# Patient Record
Sex: MALE | Race: OTHER | Employment: OTHER | URBAN - METROPOLITAN AREA
[De-identification: names, ages, dates, MRNs, and addresses within clinical notes are randomized per-mention and may not be internally consistent; named-entity substitution may affect disease eponyms.]

---

## 2017-02-22 ENCOUNTER — HOSPITAL ENCOUNTER (OUTPATIENT)
Dept: LAB | Age: 67
Discharge: HOME | End: 2017-02-22

## 2017-02-22 LAB — SENTARA SPECIMEN COL,SENBCF: NORMAL

## 2017-02-22 PROCEDURE — 99001 SPECIMEN HANDLING PT-LAB: CPT | Performed by: INTERNAL MEDICINE

## 2017-06-12 ENCOUNTER — HOSPITAL ENCOUNTER (OUTPATIENT)
Dept: VASCULAR SURGERY | Age: 67
Discharge: HOME | End: 2017-06-12
Attending: INTERNAL MEDICINE
Payer: MEDICARE

## 2017-06-12 DIAGNOSIS — I70.213 ATHEROSCLEROSIS OF NATIVE ARTERY OF BOTH LOWER EXTREMITIES WITH INTERMITTENT CLAUDICATION (HCC): ICD-10-CM

## 2017-06-12 PROCEDURE — 93923 UPR/LXTR ART STDY 3+ LVLS: CPT

## 2017-06-12 NOTE — PROCEDURES
Leonor  *** FINAL REPORT ***    Name: Teresa Leija  MRN: [id number]    Outpatient  : 22 Mar 1950  HIS Order #: [id number]  [address] [facility] Visit #: [id number]  Date: 2017    TYPE OF TEST: Peripheral Arterial Testing    REASON FOR TEST  Claudication (left side), PVD unspecified, Limb pain    Right Leg  Segmentals: Abnormal                     mmHg  Brachial         107  High thigh  Low thigh         89  Calf              83  Posterior tibial  77  Dorsalis pedis    79  Peroneal  Metatarsal  Toe pressure      42  Doppler:    Abnormal  Ankle/Brachial: 0.74    Site of occlusive disease:-  femoral and tibioperoneal segments    Left Leg  Segmentals: Normal                     mmHg  Brachial         102  High thigh  Low thigh        127  Calf             112  Posterior tibial 101  Dorsalis pedis   108  Peroneal  Metatarsal  Toe pressure      46  Doppler:    Normal  Ankle/Brachial: 1.01    INTERPRETATION/FINDINGS  Physiologic testing was performed using continuous wave Doppler and  segmental pressures. 1. Moderate peripheral arterial disease indicated at rest in the right   leg. 2. Disease is located in the superficial femoral and tibioperoneal  segments on the right side. 3. No evidence of significant peripheral arterial disease at rest in  the left leg. 4. The right ankle/brachial index is 0.74 and the left ankle/brachial  index is 1.01.  5. The digital brachial index is abnormal measuring <.60 suggestive of   digital disease. 6. No significant changes when compared with the previous study done  on 16. ADDITIONAL COMMENTS    I have personally reviewed the data relevant to the interpretation of  this  study. TECHNOLOGIST: Joyce Londono. Amanda Pump  Signed: 2017 10:37 AM    PHYSICIAN: Lebron Garcias.  Miguel Humphrey MD  Signed: 2017 01:00 PM

## 2017-07-03 ENCOUNTER — HOSPITAL ENCOUNTER (OUTPATIENT)
Dept: GENERAL RADIOLOGY | Age: 67
Discharge: HOME | End: 2017-07-03
Attending: INTERNAL MEDICINE
Payer: MEDICARE

## 2017-07-03 ENCOUNTER — HOSPITAL ENCOUNTER (OUTPATIENT)
Dept: LAB | Age: 67
Discharge: HOME | End: 2017-07-03

## 2017-07-03 DIAGNOSIS — R07.81 RIB PAIN ON RIGHT SIDE: ICD-10-CM

## 2017-07-03 DIAGNOSIS — R07.9 RIGHT-SIDED CHEST PAIN: ICD-10-CM

## 2017-07-03 DIAGNOSIS — M86.9 OSTEOMYELITIS (HCC): ICD-10-CM

## 2017-07-03 LAB — SENTARA SPECIMEN COL,SENBCF: NORMAL

## 2017-07-03 PROCEDURE — 99001 SPECIMEN HANDLING PT-LAB: CPT | Performed by: INTERNAL MEDICINE

## 2017-07-03 PROCEDURE — 72072 X-RAY EXAM THORAC SPINE 3VWS: CPT

## 2017-07-03 PROCEDURE — 71020 XR CHEST PA LAT: CPT

## 2017-07-03 PROCEDURE — 71100 X-RAY EXAM RIBS UNI 2 VIEWS: CPT

## 2017-09-12 PROBLEM — I73.9 PAD (PERIPHERAL ARTERY DISEASE) (HCC): Status: ACTIVE | Noted: 2017-08-25

## 2018-01-22 ENCOUNTER — HOSPITAL ENCOUNTER (OUTPATIENT)
Dept: LAB | Age: 68
Discharge: HOME | End: 2018-01-22

## 2018-01-22 LAB — SENTARA SPECIMEN COL,SENBCF: NORMAL

## 2018-01-22 PROCEDURE — 99001 SPECIMEN HANDLING PT-LAB: CPT | Performed by: INTERNAL MEDICINE

## 2018-05-18 ENCOUNTER — HOSPITAL ENCOUNTER (OUTPATIENT)
Dept: LAB | Age: 68
Discharge: HOME | End: 2018-05-18
Payer: MEDICARE

## 2018-05-18 ENCOUNTER — HOSPITAL ENCOUNTER (OUTPATIENT)
Dept: PREADMISSION TESTING | Age: 68
Discharge: HOME | End: 2018-05-18
Payer: MEDICARE

## 2018-05-18 DIAGNOSIS — Z01.818 PRE-OP TESTING: ICD-10-CM

## 2018-05-18 DIAGNOSIS — N52.35 ERECTILE DYSFUNCTION AFTER PROSTATE BRACHYTHERAPY: ICD-10-CM

## 2018-05-18 LAB
ATRIAL RATE: 69 BPM
CALCULATED P AXIS, ECG09: 16 DEGREES
CALCULATED R AXIS, ECG10: -39 DEGREES
CALCULATED T AXIS, ECG11: 62 DEGREES
DIAGNOSIS, 93000: NORMAL
INR PPP: 1 (ref 0.8–1.2)
P-R INTERVAL, ECG05: 186 MS
PROTHROMBIN TIME: 12.6 SEC (ref 11.5–15.2)
Q-T INTERVAL, ECG07: 408 MS
QRS DURATION, ECG06: 144 MS
QTC CALCULATION (BEZET), ECG08: 437 MS
VENTRICULAR RATE, ECG03: 69 BPM

## 2018-05-18 PROCEDURE — 93005 ELECTROCARDIOGRAM TRACING: CPT

## 2018-05-18 PROCEDURE — 36415 COLL VENOUS BLD VENIPUNCTURE: CPT | Performed by: UROLOGY

## 2018-05-18 PROCEDURE — 85610 PROTHROMBIN TIME: CPT | Performed by: UROLOGY

## 2018-06-04 ENCOUNTER — HOSPITAL ENCOUNTER (OUTPATIENT)
Dept: LAB | Age: 68
Discharge: HOME | End: 2018-06-04

## 2018-06-04 ENCOUNTER — HOSPITAL ENCOUNTER (OUTPATIENT)
Dept: PREADMISSION TESTING | Age: 68
Discharge: HOME | End: 2018-06-04

## 2018-06-04 LAB — SENTARA SPECIMEN COL,SENBCF: NORMAL

## 2018-06-04 PROCEDURE — 99001 SPECIMEN HANDLING PT-LAB: CPT | Performed by: INTERNAL MEDICINE

## 2018-06-13 ENCOUNTER — ANESTHESIA EVENT (OUTPATIENT)
Dept: SURGERY | Age: 68
DRG: 709 | End: 2018-06-13
Payer: MEDICARE

## 2018-06-14 ENCOUNTER — ANESTHESIA (OUTPATIENT)
Dept: SURGERY | Age: 68
DRG: 709 | End: 2018-06-14
Payer: MEDICARE

## 2018-06-14 ENCOUNTER — HOSPITAL ENCOUNTER (INPATIENT)
Age: 68
LOS: 1 days | Discharge: HOME | DRG: 709 | End: 2018-06-15
Attending: UROLOGY | Admitting: UROLOGY
Payer: MEDICARE

## 2018-06-14 LAB
CREAT SERPL-MCNC: 1.38 MG/DL (ref 0.6–1.3)
PLATELET # BLD AUTO: 166 K/UL (ref 135–420)

## 2018-06-14 PROCEDURE — C1813 PROSTHESIS, PENILE, INFLATAB: HCPCS | Performed by: UROLOGY

## 2018-06-14 PROCEDURE — 77030029684 HC NEB SM VOL KT MONA -A

## 2018-06-14 PROCEDURE — 74011250637 HC RX REV CODE- 250/637: Performed by: UROLOGY

## 2018-06-14 PROCEDURE — 77030020229 HC RETRCTR SCROT SYS BSC -D: Performed by: UROLOGY

## 2018-06-14 PROCEDURE — 76010000132 HC OR TIME 2.5 TO 3 HR: Performed by: UROLOGY

## 2018-06-14 PROCEDURE — 36415 COLL VENOUS BLD VENIPUNCTURE: CPT | Performed by: UROLOGY

## 2018-06-14 PROCEDURE — 0VUS0JZ SUPPLEMENT PENIS WITH SYNTHETIC SUBSTITUTE, OPEN APPROACH: ICD-10-PCS | Performed by: UROLOGY

## 2018-06-14 PROCEDURE — 74011250636 HC RX REV CODE- 250/636

## 2018-06-14 PROCEDURE — 74011250637 HC RX REV CODE- 250/637: Performed by: NURSE ANESTHETIST, CERTIFIED REGISTERED

## 2018-06-14 PROCEDURE — 94640 AIRWAY INHALATION TREATMENT: CPT

## 2018-06-14 PROCEDURE — 77030034696 HC CATH URETH FOL 2W BARD -A: Performed by: UROLOGY

## 2018-06-14 PROCEDURE — 77030012510 HC MSK AIRWY LMA TELE -B: Performed by: NURSE ANESTHETIST, CERTIFIED REGISTERED

## 2018-06-14 PROCEDURE — 77030002933 HC SUT MCRYL J&J -A: Performed by: UROLOGY

## 2018-06-14 PROCEDURE — 76210000000 HC OR PH I REC 2 TO 2.5 HR: Performed by: UROLOGY

## 2018-06-14 PROCEDURE — 74011000250 HC RX REV CODE- 250

## 2018-06-14 PROCEDURE — 74011250636 HC RX REV CODE- 250/636: Performed by: UROLOGY

## 2018-06-14 PROCEDURE — 65270000029 HC RM PRIVATE

## 2018-06-14 PROCEDURE — 77030031139 HC SUT VCRL2 J&J -A: Performed by: UROLOGY

## 2018-06-14 PROCEDURE — 77030005537 HC CATH URETH BARD -A: Performed by: UROLOGY

## 2018-06-14 PROCEDURE — 77030002966 HC SUT PDS J&J -A: Performed by: UROLOGY

## 2018-06-14 PROCEDURE — 82565 ASSAY OF CREATININE: CPT | Performed by: UROLOGY

## 2018-06-14 PROCEDURE — 77030013079 HC BLNKT BAIR HGGR 3M -A: Performed by: NURSE ANESTHETIST, CERTIFIED REGISTERED

## 2018-06-14 PROCEDURE — 77030011640 HC PAD GRND REM COVD -A: Performed by: UROLOGY

## 2018-06-14 PROCEDURE — 77030013782 HC KT PENIL INFL AMS -C: Performed by: UROLOGY

## 2018-06-14 PROCEDURE — 74011000250 HC RX REV CODE- 250: Performed by: UROLOGY

## 2018-06-14 PROCEDURE — 85049 AUTOMATED PLATELET COUNT: CPT | Performed by: UROLOGY

## 2018-06-14 PROCEDURE — 74011250636 HC RX REV CODE- 250/636: Performed by: NURSE ANESTHETIST, CERTIFIED REGISTERED

## 2018-06-14 PROCEDURE — 77030032490 HC SLV COMPR SCD KNE COVD -B: Performed by: UROLOGY

## 2018-06-14 PROCEDURE — 74011000258 HC RX REV CODE- 258: Performed by: UROLOGY

## 2018-06-14 PROCEDURE — 77030020263 HC SOL INJ SOD CL0.9% LFCR 1000ML: Performed by: UROLOGY

## 2018-06-14 PROCEDURE — 77030010507 HC ADH SKN DERMBND J&J -B: Performed by: UROLOGY

## 2018-06-14 PROCEDURE — 76060000036 HC ANESTHESIA 2.5 TO 3 HR: Performed by: UROLOGY

## 2018-06-14 DEVICE — INFLATABLE PENILE PROSTHESIS, INHIBIZONE/PRECONNECTED - PENOSCROTAL 1 PUMP 2 CYLINDERS
Type: IMPLANTABLE DEVICE | Site: PENIS | Status: FUNCTIONAL
Brand: AMS 700 CX MS PUMP

## 2018-06-14 DEVICE — AMS 700 INFLATABLE PENILE PROSTHESIS, 1 SPHERICAL RESERVOIR, INHIBIZONE TREATED
Type: IMPLANTABLE DEVICE | Site: PENIS | Status: FUNCTIONAL
Brand: AMS 700 SPHERICAL RESERVOIR

## 2018-06-14 DEVICE — NON-STACKABLE REAR TIP EXTENDERS FOR AMS 700 CX AND LGX CYLINDERS
Type: IMPLANTABLE DEVICE | Site: PENIS | Status: FUNCTIONAL
Brand: REAR TIP EXTENDER

## 2018-06-14 RX ORDER — MAGNESIUM SULFATE 100 %
4 CRYSTALS MISCELLANEOUS AS NEEDED
Status: DISCONTINUED | OUTPATIENT
Start: 2018-06-14 | End: 2018-06-14

## 2018-06-14 RX ORDER — INSULIN LISPRO 100 [IU]/ML
INJECTION, SOLUTION INTRAVENOUS; SUBCUTANEOUS ONCE
Status: DISCONTINUED | OUTPATIENT
Start: 2018-06-14 | End: 2018-06-14

## 2018-06-14 RX ORDER — HYDROMORPHONE HYDROCHLORIDE 2 MG/ML
INJECTION, SOLUTION INTRAMUSCULAR; INTRAVENOUS; SUBCUTANEOUS AS NEEDED
Status: DISCONTINUED | OUTPATIENT
Start: 2018-06-14 | End: 2018-06-14 | Stop reason: HOSPADM

## 2018-06-14 RX ORDER — SODIUM CHLORIDE, SODIUM LACTATE, POTASSIUM CHLORIDE, CALCIUM CHLORIDE 600; 310; 30; 20 MG/100ML; MG/100ML; MG/100ML; MG/100ML
25 INJECTION, SOLUTION INTRAVENOUS CONTINUOUS
Status: DISCONTINUED | OUTPATIENT
Start: 2018-06-14 | End: 2018-06-14 | Stop reason: HOSPADM

## 2018-06-14 RX ORDER — LIDOCAINE HYDROCHLORIDE 10 MG/ML
0.1 INJECTION INFILTRATION; PERINEURAL AS NEEDED
Status: DISCONTINUED | OUTPATIENT
Start: 2018-06-14 | End: 2018-06-14 | Stop reason: HOSPADM

## 2018-06-14 RX ORDER — HYDROMORPHONE HYDROCHLORIDE 1 MG/ML
.5-1 INJECTION, SOLUTION INTRAMUSCULAR; INTRAVENOUS; SUBCUTANEOUS
Status: DISCONTINUED | OUTPATIENT
Start: 2018-06-14 | End: 2018-06-15 | Stop reason: HOSPADM

## 2018-06-14 RX ORDER — HYDROMORPHONE HYDROCHLORIDE 2 MG/ML
0.2 INJECTION, SOLUTION INTRAMUSCULAR; INTRAVENOUS; SUBCUTANEOUS AS NEEDED
Status: DISCONTINUED | OUTPATIENT
Start: 2018-06-14 | End: 2018-06-14

## 2018-06-14 RX ORDER — HYDROMORPHONE HYDROCHLORIDE 1 MG/ML
INJECTION, SOLUTION INTRAMUSCULAR; INTRAVENOUS; SUBCUTANEOUS
Status: COMPLETED
Start: 2018-06-14 | End: 2018-06-14

## 2018-06-14 RX ORDER — BUDESONIDE 0.5 MG/2ML
500 INHALANT ORAL
Status: DISCONTINUED | OUTPATIENT
Start: 2018-06-14 | End: 2018-06-15 | Stop reason: HOSPADM

## 2018-06-14 RX ORDER — OXYCODONE AND ACETAMINOPHEN 5; 325 MG/1; MG/1
1 TABLET ORAL AS NEEDED
Status: DISCONTINUED | OUTPATIENT
Start: 2018-06-14 | End: 2018-06-14

## 2018-06-14 RX ORDER — OXYCODONE AND ACETAMINOPHEN 5; 325 MG/1; MG/1
1-2 TABLET ORAL
Status: DISCONTINUED | OUTPATIENT
Start: 2018-06-14 | End: 2018-06-15 | Stop reason: HOSPADM

## 2018-06-14 RX ORDER — HYDROMORPHONE HYDROCHLORIDE 2 MG/ML
0.5 INJECTION, SOLUTION INTRAMUSCULAR; INTRAVENOUS; SUBCUTANEOUS
Status: DISCONTINUED | OUTPATIENT
Start: 2018-06-14 | End: 2018-06-14

## 2018-06-14 RX ORDER — ALBUTEROL SULFATE 0.83 MG/ML
2.5 SOLUTION RESPIRATORY (INHALATION)
Status: DISCONTINUED | OUTPATIENT
Start: 2018-06-14 | End: 2018-06-15 | Stop reason: HOSPADM

## 2018-06-14 RX ORDER — IPRATROPIUM BROMIDE AND ALBUTEROL SULFATE 2.5; .5 MG/3ML; MG/3ML
SOLUTION RESPIRATORY (INHALATION)
Status: COMPLETED
Start: 2018-06-14 | End: 2018-06-14

## 2018-06-14 RX ORDER — MIDAZOLAM HYDROCHLORIDE 1 MG/ML
INJECTION, SOLUTION INTRAMUSCULAR; INTRAVENOUS AS NEEDED
Status: DISCONTINUED | OUTPATIENT
Start: 2018-06-14 | End: 2018-06-14 | Stop reason: HOSPADM

## 2018-06-14 RX ORDER — TAMSULOSIN HYDROCHLORIDE 0.4 MG/1
0.4 CAPSULE ORAL 2 TIMES DAILY
Status: DISCONTINUED | OUTPATIENT
Start: 2018-06-14 | End: 2018-06-15 | Stop reason: HOSPADM

## 2018-06-14 RX ORDER — VANCOMYCIN/0.9 % SOD CHLORIDE 1.5G/250ML
1500 PLASTIC BAG, INJECTION (ML) INTRAVENOUS
Status: COMPLETED | OUTPATIENT
Start: 2018-06-14 | End: 2018-06-14

## 2018-06-14 RX ORDER — FAMOTIDINE 20 MG/1
20 TABLET, FILM COATED ORAL ONCE
Status: COMPLETED | OUTPATIENT
Start: 2018-06-14 | End: 2018-06-14

## 2018-06-14 RX ORDER — SODIUM CHLORIDE, SODIUM LACTATE, POTASSIUM CHLORIDE, CALCIUM CHLORIDE 600; 310; 30; 20 MG/100ML; MG/100ML; MG/100ML; MG/100ML
100 INJECTION, SOLUTION INTRAVENOUS CONTINUOUS
Status: DISCONTINUED | OUTPATIENT
Start: 2018-06-14 | End: 2018-06-14

## 2018-06-14 RX ORDER — HYDROMORPHONE HYDROCHLORIDE 1 MG/ML
INJECTION, SOLUTION INTRAMUSCULAR; INTRAVENOUS; SUBCUTANEOUS
Status: DISPENSED
Start: 2018-06-14 | End: 2018-06-15

## 2018-06-14 RX ORDER — DIPHENHYDRAMINE HYDROCHLORIDE 50 MG/ML
12.5 INJECTION, SOLUTION INTRAMUSCULAR; INTRAVENOUS
Status: DISCONTINUED | OUTPATIENT
Start: 2018-06-14 | End: 2018-06-14

## 2018-06-14 RX ORDER — SODIUM CHLORIDE 0.9 % (FLUSH) 0.9 %
5-10 SYRINGE (ML) INJECTION AS NEEDED
Status: DISCONTINUED | OUTPATIENT
Start: 2018-06-14 | End: 2018-06-15 | Stop reason: HOSPADM

## 2018-06-14 RX ORDER — SODIUM CHLORIDE 0.9 % (FLUSH) 0.9 %
5-10 SYRINGE (ML) INJECTION EVERY 8 HOURS
Status: DISCONTINUED | OUTPATIENT
Start: 2018-06-14 | End: 2018-06-15 | Stop reason: HOSPADM

## 2018-06-14 RX ORDER — ALBUTEROL SULFATE 90 UG/1
2 AEROSOL, METERED RESPIRATORY (INHALATION)
Status: DISCONTINUED | OUTPATIENT
Start: 2018-06-14 | End: 2018-06-14 | Stop reason: CLARIF

## 2018-06-14 RX ORDER — PROPOFOL 10 MG/ML
INJECTION, EMULSION INTRAVENOUS AS NEEDED
Status: DISCONTINUED | OUTPATIENT
Start: 2018-06-14 | End: 2018-06-14 | Stop reason: HOSPADM

## 2018-06-14 RX ORDER — DIPHENHYDRAMINE HCL 25 MG
25 CAPSULE ORAL
Status: DISCONTINUED | OUTPATIENT
Start: 2018-06-14 | End: 2018-06-15 | Stop reason: HOSPADM

## 2018-06-14 RX ORDER — LIDOCAINE HYDROCHLORIDE 20 MG/ML
INJECTION, SOLUTION EPIDURAL; INFILTRATION; INTRACAUDAL; PERINEURAL AS NEEDED
Status: DISCONTINUED | OUTPATIENT
Start: 2018-06-14 | End: 2018-06-14 | Stop reason: HOSPADM

## 2018-06-14 RX ORDER — LISINOPRIL 5 MG/1
2.5 TABLET ORAL DAILY
Status: DISCONTINUED | OUTPATIENT
Start: 2018-06-15 | End: 2018-06-15 | Stop reason: HOSPADM

## 2018-06-14 RX ORDER — NALOXONE HYDROCHLORIDE 0.4 MG/ML
0.4 INJECTION, SOLUTION INTRAMUSCULAR; INTRAVENOUS; SUBCUTANEOUS AS NEEDED
Status: DISCONTINUED | OUTPATIENT
Start: 2018-06-14 | End: 2018-06-15 | Stop reason: HOSPADM

## 2018-06-14 RX ORDER — CIPROFLOXACIN 500 MG/1
500 TABLET ORAL 2 TIMES DAILY
Qty: 28 TAB | Refills: 0 | Status: SHIPPED | OUTPATIENT
Start: 2018-06-14 | End: 2018-06-28

## 2018-06-14 RX ORDER — ONDANSETRON 2 MG/ML
INJECTION INTRAMUSCULAR; INTRAVENOUS AS NEEDED
Status: DISCONTINUED | OUTPATIENT
Start: 2018-06-14 | End: 2018-06-14 | Stop reason: HOSPADM

## 2018-06-14 RX ORDER — ONDANSETRON 2 MG/ML
4 INJECTION INTRAMUSCULAR; INTRAVENOUS
Status: DISCONTINUED | OUTPATIENT
Start: 2018-06-14 | End: 2018-06-15 | Stop reason: HOSPADM

## 2018-06-14 RX ORDER — FENTANYL CITRATE 50 UG/ML
INJECTION, SOLUTION INTRAMUSCULAR; INTRAVENOUS AS NEEDED
Status: DISCONTINUED | OUTPATIENT
Start: 2018-06-14 | End: 2018-06-14 | Stop reason: HOSPADM

## 2018-06-14 RX ORDER — EPHEDRINE SULFATE 50 MG/ML
INJECTION, SOLUTION INTRAVENOUS AS NEEDED
Status: DISCONTINUED | OUTPATIENT
Start: 2018-06-14 | End: 2018-06-14 | Stop reason: HOSPADM

## 2018-06-14 RX ORDER — BUDESONIDE AND FORMOTEROL FUMARATE DIHYDRATE 160; 4.5 UG/1; UG/1
1 AEROSOL RESPIRATORY (INHALATION) 2 TIMES DAILY
Status: DISCONTINUED | OUTPATIENT
Start: 2018-06-14 | End: 2018-06-14 | Stop reason: CLARIF

## 2018-06-14 RX ORDER — MONTELUKAST SODIUM 10 MG/1
10 TABLET ORAL
Status: DISCONTINUED | OUTPATIENT
Start: 2018-06-14 | End: 2018-06-15 | Stop reason: HOSPADM

## 2018-06-14 RX ORDER — HEPARIN SODIUM 5000 [USP'U]/ML
5000 INJECTION, SOLUTION INTRAVENOUS; SUBCUTANEOUS EVERY 8 HOURS
Status: DISCONTINUED | OUTPATIENT
Start: 2018-06-14 | End: 2018-06-15 | Stop reason: HOSPADM

## 2018-06-14 RX ORDER — FAMOTIDINE 20 MG/1
TABLET, FILM COATED ORAL
Status: DISPENSED
Start: 2018-06-14 | End: 2018-06-14

## 2018-06-14 RX ORDER — CARVEDILOL 6.25 MG/1
6.25 TABLET ORAL DAILY
Status: DISCONTINUED | OUTPATIENT
Start: 2018-06-15 | End: 2018-06-15 | Stop reason: HOSPADM

## 2018-06-14 RX ORDER — IPRATROPIUM BROMIDE AND ALBUTEROL SULFATE 2.5; .5 MG/3ML; MG/3ML
3 SOLUTION RESPIRATORY (INHALATION)
Status: COMPLETED | OUTPATIENT
Start: 2018-06-14 | End: 2018-06-14

## 2018-06-14 RX ORDER — DOCUSATE SODIUM 100 MG/1
100 CAPSULE, LIQUID FILLED ORAL DAILY
Qty: 30 CAP | Refills: 0 | Status: SHIPPED | OUTPATIENT
Start: 2018-06-14 | End: 2018-06-25

## 2018-06-14 RX ORDER — BUDESONIDE 0.5 MG/2ML
500 INHALANT ORAL
Status: DISCONTINUED | OUTPATIENT
Start: 2018-06-14 | End: 2018-06-14

## 2018-06-14 RX ORDER — ACETAMINOPHEN 325 MG/1
650 TABLET ORAL
Status: DISCONTINUED | OUTPATIENT
Start: 2018-06-14 | End: 2018-06-15 | Stop reason: HOSPADM

## 2018-06-14 RX ORDER — OXYCODONE AND ACETAMINOPHEN 5; 325 MG/1; MG/1
1 TABLET ORAL
Qty: 10 TAB | Refills: 0 | Status: SHIPPED | OUTPATIENT
Start: 2018-06-14 | End: 2018-06-25

## 2018-06-14 RX ORDER — DEXTROSE MONOHYDRATE 25 G/50ML
25-50 INJECTION, SOLUTION INTRAVENOUS AS NEEDED
Status: DISCONTINUED | OUTPATIENT
Start: 2018-06-14 | End: 2018-06-14

## 2018-06-14 RX ORDER — SODIUM CHLORIDE 0.9 % (FLUSH) 0.9 %
5-10 SYRINGE (ML) INJECTION AS NEEDED
Status: DISCONTINUED | OUTPATIENT
Start: 2018-06-14 | End: 2018-06-14

## 2018-06-14 RX ORDER — ARFORMOTEROL TARTRATE 15 UG/2ML
15 SOLUTION RESPIRATORY (INHALATION)
Status: DISCONTINUED | OUTPATIENT
Start: 2018-06-14 | End: 2018-06-15 | Stop reason: HOSPADM

## 2018-06-14 RX ADMIN — BUDESONIDE 500 MCG: 0.5 INHALANT RESPIRATORY (INHALATION) at 20:26

## 2018-06-14 RX ADMIN — FENTANYL CITRATE 50 MCG: 50 INJECTION, SOLUTION INTRAMUSCULAR; INTRAVENOUS at 09:27

## 2018-06-14 RX ADMIN — MONTELUKAST SODIUM 10 MG: 10 TABLET, FILM COATED ORAL at 21:47

## 2018-06-14 RX ADMIN — FENTANYL CITRATE 50 MCG: 50 INJECTION, SOLUTION INTRAMUSCULAR; INTRAVENOUS at 09:18

## 2018-06-14 RX ADMIN — TOBRAMYCIN SULFATE 400 MG: 40 INJECTION, SOLUTION INTRAMUSCULAR; INTRAVENOUS at 08:32

## 2018-06-14 RX ADMIN — LIDOCAINE HYDROCHLORIDE 80 MG: 20 INJECTION, SOLUTION EPIDURAL; INFILTRATION; INTRACAUDAL; PERINEURAL at 09:14

## 2018-06-14 RX ADMIN — HYDROMORPHONE HYDROCHLORIDE 1 MG: 2 INJECTION, SOLUTION INTRAMUSCULAR; INTRAVENOUS; SUBCUTANEOUS at 10:25

## 2018-06-14 RX ADMIN — PROPOFOL 30 MG: 10 INJECTION, EMULSION INTRAVENOUS at 09:15

## 2018-06-14 RX ADMIN — TAMSULOSIN HYDROCHLORIDE 0.4 MG: 0.4 CAPSULE ORAL at 21:47

## 2018-06-14 RX ADMIN — HYDROMORPHONE HYDROCHLORIDE 0.5 MG: 2 INJECTION, SOLUTION INTRAMUSCULAR; INTRAVENOUS; SUBCUTANEOUS at 13:23

## 2018-06-14 RX ADMIN — FENTANYL CITRATE 50 MCG: 50 INJECTION, SOLUTION INTRAMUSCULAR; INTRAVENOUS at 09:48

## 2018-06-14 RX ADMIN — EPHEDRINE SULFATE 10 MG: 50 INJECTION, SOLUTION INTRAVENOUS at 09:31

## 2018-06-14 RX ADMIN — IPRATROPIUM BROMIDE AND ALBUTEROL SULFATE 3 ML: .5; 3 SOLUTION RESPIRATORY (INHALATION) at 12:20

## 2018-06-14 RX ADMIN — FENTANYL CITRATE 50 MCG: 50 INJECTION, SOLUTION INTRAMUSCULAR; INTRAVENOUS at 10:00

## 2018-06-14 RX ADMIN — Medication 10 ML: at 18:04

## 2018-06-14 RX ADMIN — VANCOMYCIN HYDROCHLORIDE 1500 MG: 10 INJECTION, POWDER, LYOPHILIZED, FOR SOLUTION INTRAVENOUS at 09:09

## 2018-06-14 RX ADMIN — ONDANSETRON 4 MG: 2 INJECTION INTRAMUSCULAR; INTRAVENOUS at 11:47

## 2018-06-14 RX ADMIN — SODIUM CHLORIDE, SODIUM LACTATE, POTASSIUM CHLORIDE, AND CALCIUM CHLORIDE 25 ML/HR: 600; 310; 30; 20 INJECTION, SOLUTION INTRAVENOUS at 08:14

## 2018-06-14 RX ADMIN — OXYCODONE HYDROCHLORIDE AND ACETAMINOPHEN 2 TABLET: 5; 325 TABLET ORAL at 18:09

## 2018-06-14 RX ADMIN — ARFORMOTEROL TARTRATE 15 MCG: 15 SOLUTION RESPIRATORY (INHALATION) at 20:26

## 2018-06-14 RX ADMIN — ACETAMINOPHEN 650 MG: 325 TABLET ORAL at 21:47

## 2018-06-14 RX ADMIN — HYDROMORPHONE HYDROCHLORIDE 0.5 MG: 1 INJECTION, SOLUTION INTRAMUSCULAR; INTRAVENOUS; SUBCUTANEOUS at 12:17

## 2018-06-14 RX ADMIN — PROPOFOL 50 MG: 10 INJECTION, EMULSION INTRAVENOUS at 09:46

## 2018-06-14 RX ADMIN — Medication 10 ML: at 22:25

## 2018-06-14 RX ADMIN — IPRATROPIUM BROMIDE AND ALBUTEROL SULFATE 3 ML: 2.5; .5 SOLUTION RESPIRATORY (INHALATION) at 12:20

## 2018-06-14 RX ADMIN — FAMOTIDINE 20 MG: 20 TABLET ORAL at 08:00

## 2018-06-14 RX ADMIN — HEPARIN SODIUM 5000 UNITS: 5000 INJECTION, SOLUTION INTRAVENOUS; SUBCUTANEOUS at 22:24

## 2018-06-14 RX ADMIN — MIDAZOLAM HYDROCHLORIDE 2 MG: 1 INJECTION, SOLUTION INTRAMUSCULAR; INTRAVENOUS at 09:09

## 2018-06-14 RX ADMIN — PROPOFOL 170 MG: 10 INJECTION, EMULSION INTRAVENOUS at 09:14

## 2018-06-14 RX ADMIN — HEPARIN SODIUM 5000 UNITS: 5000 INJECTION, SOLUTION INTRAVENOUS; SUBCUTANEOUS at 18:03

## 2018-06-14 RX ADMIN — HYDROMORPHONE HYDROCHLORIDE 0.5 MG: 2 INJECTION, SOLUTION INTRAMUSCULAR; INTRAVENOUS; SUBCUTANEOUS at 11:02

## 2018-06-14 NOTE — OP NOTES
[address] REPORT    Ralph Nunez  MR#: [id number]  : 1950  ACCOUNT #: [de-identified]   DATE OF SERVICE: 2018    PREOPERATIVE DIAGNOSIS:  Erectile dysfunction. POSTOPERATIVE DIAGNOSIS:  Erectile dysfunction. PROCEDURE PERFORMED:  Implant of AMS  inflatable penile prosthesis. IMPLANT:  AMS  inflatable penile prosthesis. PRIMARY SURGEON:  Hiram Quiroz MD     ASSISTANTS:  Nayely Garcia MD, fellow, and Bradley Nava, resident. ANESTHESIA:  General.    ESTIMATED BLOOD LOSS:  50 mL. IMPLANT PLACED. 18 cm cylinders with a 3 cm rear tip extenders, 65 mL reservoir in the right space of Retzius and a scrotal pump. DRAINS PLACED:  A 14 Brazilian urethral Houston. COMPLICATIONS:  None. CONDITION:  Stable. FINDINGS:  An 11 cm corpora measured to 13 cm proximally and 8 cm distally on both sides. INDICATIONS FOR PROCEDURE:  The patient is a 56-year-old gentleman who suffers from erectile dysfunction following radiation therapy for prostate cancer a few years ago. He has tried and failed oral medications as well as intracavernosal injections. He is currently using the ROLF to maintain length, but is not satisfied with this and would prefer to proceed with inflatable penile prosthesis. The risks and benefits have all been explained and the patient signed the preop consent. TECHNIQUE:  The patient was brought to the operating room. Preoperative prophylactic antibiotics had been given and SCD stockings were installed and activated for induction of general anesthesia. He was kept in the supine position. The genital area was completely shaved, prepped with a 10 minute to alcohol base skin prep and then he was draped in a sterile fashion and a timeout according to protocol was performed. We performed a transverse scrotal incision and dissected down to the base of the corpora. The Ron retractor was used for visualization.   We dissected the corpora bilaterally as well as the corpus spongiosum as proximally as possible. A 14-Citizen of [place] had been inserted at the beginning of the case. We then marked and performed our corporotomies as proximally as possible bilaterally. We dilated both sides from size 9 to a size 12 [place] dilators and then performed urethral leak test on both sides which was negative. We measured on both sides at 13 cm proximally and 8 cm distally, we opted for an 18 cm cylinders with 3 cm rear tip extenders which were prepared on the side table in a sterile fashion by the scrub nurse as well as the 65 mL reservoir. While this was being prepared, we dissected through the right inguinal canal and pierced the transversalis fashion to the space of Retzius to create the pocket for the reservoir. We did this after the bladder was emptied. We then placed the reservoir here and filled it with 65 mL of injectable saline. We then placed the cylinders on both sides into place using the Marie needles and the [facility] introducer. Once both cylinders were in, we did a trial pump and we were satisfied with the placement of our cylinders. The cylinders were deflated and the corporotomies were closed in a watertight fashion. We then dissected the scrotal pouch for the scrotal pump. The tubing was buried and then we performed the connectors with the cord connected to the tip that is provided by the company. We then closed the incision in 2 layers being sure to adequately buried the tubing. The skin was closed with a running 4-0 Monocryl stitch. A bunion wrap, dry dressing was placed. The patient was awoken from general anesthesia and brought to PACU in stable condition. He will be admitted overnight for postoperative care. MD Norma Jeffers / BONI  D: 06/14/2018 12:02     T: 06/14/2018 13:34  JOB #: [id number]      I was present and have independently reviewed the diagnosis and discussed the plan with Elin Elias MD , and I agree. Vasquez Sylvester MD, MD  Urology of [facility]  [address] [place] [address].  Denise Cifuentes

## 2018-06-14 NOTE — H&P
PREOPERATIVE HISTORY AND PHYSICAL EXAMINATION     Subjective:      I am seeing Mr. Kourtney Alex, a 79 y.o. male, for preop history and physical exam.     Planned surgery: LGX vs CX IPP implantation. Location: [facility]   Admission date: 6/14/2018  Surgery date: 6/14/2018  Admitting physician: Dr. Mally Diego  Admitting diagnosis: ED. Allergies: No Known Allergies     Past medical history:        Past Medical History:   Diagnosis Date    Asthma      Benign hypertensive heart disease with congestive heart failure and with combined systolic and diastolic dysfunction (HCC)      Bronchial asthma      CAD (coronary artery disease)      Cardiomyopathy (HCC)       LVEF 35-40% (01/16)    CHF (congestive heart failure) ([facility])      Chronic combined systolic and diastolic heart failure ([facility] 04/21/2014    Coronary artery disease involving native coronary artery of native heart without angina pectoris 1/16/2016     CABG X 2 ( LIMA to LAD , SVG to RCA)    Decreased calculated glomerular filtration rate (GFR) 5/6/2016     Calculated GFR equivalent to that of CKD stage 2 = 60-89 ml/min     Dyslipidemia      Elevated PSA      Erectile dysfunction      Esophageal reflux      H/O echocardiogram      Heart disease      History of external beam radiation therapy       8/2014    History of kidney stones      History of vitamin D deficiency 9/23/2014    HTN (hypertension)      Hypercholesterolemia      Hypogonadism in male 3/26/2015    Kidney stone      Malignant neoplasm of prostate [facility])       XRBT 2014    Malignant neoplasm without specification of site [facility])      Methicillin resistant Staphylococcus aureus infection 4/29/2016    Osteomyelitis of vertebra of thoracic region ([facility] 4/29/2016    Paraspinal mass      Personal history of prostate cancer        [id number] [place] Grade 3+4. Presenting PSA of 13.5. Brachytherapy 2014.     Renal impairment      SOB (shortness of breath)      UTI (urinary tract infection)        Past surgical history:         Past Surgical History:   Procedure Laterality Date    HX COLONOSCOPY        HX OTHER SURGICAL   09/18/2017     femoral angiogram    HX UROLOGICAL   8/10/15, 10/21/15     Ureter. Diag. ,       Social history:    Social History                Social History    Marital status: LEGALLY        Spouse name: N/A    Number of children: N/A    Years of education: N/A            Social History Main Topics    Smoking status: Former Smoker       Packs/day: 3.00       Years: 40.00       Types: Cigarettes       Quit date: 2003    Smokeless tobacco: Never Used    Alcohol use No    Drug use: No    Sexual activity: Yes       Partners: Female           Other Topics Concern    None      Social History Narrative         Family history:   Family History   Problem Relation Age of Onset    Adopted: Yes    Family history unknown: Yes         Current medications:          Current Outpatient Prescriptions   Medication Sig Dispense Refill    umeclidinium (INCRUSE ELLIPTA) 62.5 mcg/actuation inhaler Take  inhaled by mouth.        oxyCODONE-acetaminophen (PERCOCET) 5-325 mg per tablet Take 1 Tab by mouth daily. Max Daily Amount: 1 Tab. 12 Tab 0    sertraline (ZOLOFT) 50 mg tablet Take  by mouth daily.        SYMBICORT 160-4.5 mcg/actuation HFAA     5    clopidogrel (PLAVIX) 75 mg tab     2    atorvastatin (LIPITOR) 80 mg tablet     5    carvedilol (COREG) 6.25 mg tablet     3    FLOMAX 0.4 mg capsule Take 1 Cap by mouth two (2) times a day. 60 Cap 11    lisinopril (PRINIVIL, ZESTRIL) 2.5 mg tablet Take 1 Tab by mouth daily.  [12:00 PM]  Indications: CHRONIC HEART FAILURE 15 Tab 0    albuterol (VENTOLIN HFA) 90 mcg/actuation inhaler Take 2 Puffs by inhalation every six (6) hours as needed for Wheezing.        montelukast (SINGULAIR) 10 mg tablet Take 10 mg by mouth nightly.             Chief complaint:        Chief Complaint   Patient presents with    Erectile Dysfunction       Patient presents today for a hospital workup for IPP placement s/p on 6/14/18.         History of present illness: Luther Hull is a 79 y.o. male with a history of ED who presents today      Continue using Trimix ICI for ED, but this has become less effective and expensive. Patient is interested in prosthesis placement and has been scheduled for 6/14/2018 at [facility]. Has been using ROLF to prepare tissues. Patient denies any significant changes in medical history since last seen. No voiding complaints. No dysuria or gross hematuria. No n/v/f/c.      Urologic history   He is s/p XRBT 8/2014. Last PSA was 0.07 on 2/21/2018 - followed by Dr. Genaro Almanzar. H/o kidney stones s/p URS w/ LL 10/2015 w/ Dr. Noemy Ozuna      Review of systems:   Constitutional: Fever: No  Skin: Rash: No  HEENT: Hearing difficulty: No  Eyes: Blurred vision: No  Cardiovascular: Chest pain: No  Respiratory: Shortness of breath: No  Gastrointestinal: Nausea/vomiting: No  Musculoskeletal: Back pain: No  Neurological: Weakness: No  Psychological: Memory loss: No  Comments/additional findings:        Objective:   Physical examination:       Visit Vitals    /70    Ht 5' 10.5\" (1.791 m)    Wt 220 lb (99.8 kg)    BMI 31.12 kg/m2      Constitutional: WDWN, Pleasant and appropriate affect, No acute distress. CV:  No peripheral swelling noted. RRR. No murmurs, rubs, or gallops. Respiratory: No respiratory distress or difficulties. Breath sounds clear and equal bilaterally. Abdomen:  No abdominal masses or tenderness. No CVA tenderness. No hernias noted.  Male:    SCROTUM:  No scrotal rash or lesions noticed. Normal bilateral testes and epididymis. PENIS: Urethral meatus normal in location and size. No urethral discharge. Penis measured to 16cm stretched. Skin: No jaundice. Neuro/Psych:  Alert and oriented x 3. Affect appropriate. Lymphatic:   No enlarged inguinal lymph nodes.    Assessment/Plan:   Justo Griffin will be brought to the operating room. We will proceed with planned surgery. All questions were answered. The indications, options, risks and benefits of the procedure have been explained in great detail. The complications discussed are in no way limited to the content of this note. The patient understands, and requests to proceed with surgery. Consent has been obtained.     CONSENT FOR PLACEMENT OF INFLATABLE PENILE PROSTHESIS, CYSTOSCOPY     The risks, benefits and alternatives to surgical intervention were discussed at length with the patient. Alternatives include no intervention, use of oral medications such as sildenafil (Viagra), injection of medications into the penis to produce an erection, use of the vacuum erection device or implantation of a penile prosthesis. The patient is most interested in a prosthesis so this was reviewed in the greatest detail. The types of prosthesis including malleable rods, 2 piece and 3 piece prostheses were reviewed previously with the patient. The intended benefit of penile prosthesis placement is to allow the patient to use the device to create an artificial erection and resume relatively normal sexual activity. Risks of the procedure include, but are not limited to, bleeding, wound infection, and injury to surrounding structures during the procedure. If the urethra is injured during implantation of the device, the procedure may have to be aborted before completing device placement to allow the injury to heal.  Wound infection is one of the most concerning complications as an infection around the prosthesis would likely require removal of the prosthesis to allow the infection to completely clear up, resulting in ongoing inability to have erections. Some, but not all, of the available prosthetic devices have an antibiotic coating which can reduce the risk of infection around the device.   We discussed that the prosthesis is a mechanical device and will eventually wear out. If this occurs and the patient desires to continue with sexual activity, the device can be replaced but this will require a repeat trip to the operating room. The patient expresses an understanding of these risks, benefits and alternatives, had all questions answered and requests that we proceed as planned with placement of an inflatable penile prosthesis in the MOR.     Patient requests post op meds so that he has time to fill. Rx for Percocet 5-325mg QHS provided. No additional pain meds will be provided.      Lana Brandt MD  [facility]  A Division of Urology of [address] [place] [address] seen in pre-op on am of surgery w Dr Diogo Winter well, no changes since last seen    Pre-op UCx -ve  Sigmund Gene OCTOR    Consent signed for IPP  All Qs answered    James Mora MD - [address] fellow    I was present and have independently reviewed the diagnosis and discussed the plan with Pearl Martinez MD , and I agree. Lana Brandt MD, MD  Urology of [facility]  [address] [facility].  Denise Cifuentes

## 2018-06-14 NOTE — BRIEF OP NOTE
BRIEF OPERATIVE NOTE    Date of Procedure: 6/14/2018   Preoperative Diagnosis: n52.35 erecticle dysfunction following radiation theraphy  Postoperative Diagnosis: n52.35 erecticle dysfunction following radiation theraphy    Dictation #: [id number]  Procedure(s):  inflatable PENILE PROSTHESIS implantation   Surgeon(s) and Role:     * Marcy May MD - Primary         Surgical Assistant: Larisa Tejeda fellow and Radha Lee resident    Surgical Staff:  Circ-1: Batsheva Hudson RN  Circ-Relief: Vivek Govea RN  Scrub Tech-1: Maribel Daigle  Scrub Tech-Relief: Amaya Velez  Event Time In   Incision Start  9:43 AM   Incision Close 11:43 AM     Anesthesia: General   Estimated Blood Loss: 50cc  Specimens: * No specimens in log *   Findings: 21cm corpora x 2 (13 prox and 8 distal), 18cm+3 CX cylinders used, 65cc reservoir in right space of Retzius   Complications: none  Implants:   Implant Name Type Inv. Item Serial No.  Lot No. LRB No. Used Action   ams 700 accessory kit    [employer] [id number] N/A 1 Implanted   EXTENDER REAR TIP 12-14 --  - [id number]  EXTENDER REAR TIP 12-14 --   [facility] [id number] N/A 1 Implanted   CYL PUMP PENILE CX MS PS --  - [id number]  CYL PUMP PENILE CX MS PS --   [employer] [facility] [id number] N/A 1 Implanted   RESERVIOR PENILE PROS IZ 65 --  - [id number]   RESERVIOR PENILE PROS IZ 65 --    [employer] [facility] [id number] N/A 1 Implanted         Jelani Feliciano MD - [address] [place] [address] fellow      I was present and have independently reviewed the diagnosis and discussed the plan with Veronica Goldstein MD , and I agree. Marcy May MD, MD  Urology of [facility]  [address] [facility].  [facility]

## 2018-06-14 NOTE — PERIOP NOTES
Rec'd care of pt from OR via stretcher. Resp even and unlabored. Attached to monitor. VSS. OR, MAR and anesthesia report acknowledged. [place] patent. Will cont to monitor. 1210  Pt with expiratory wheezes noted. Also stated he feels a \"little tight\". Anesthesia notified. Will cont to monitor. 443 [facility] tx given for above. Tolerating well. Will cont to monitor. Tamela Carrasco 1778, CRNA notified regarding blood pressure measurement. Stated okay to discharge pt to assigned room.

## 2018-06-14 NOTE — PROGRESS NOTES
TRANSFER - IN REPORT:    Verbal report received from Lisa Dance on Budrashmi Sandifer  being received from PACU for routine post - op      Report consisted of patients Situation, Background, Assessment and   Recommendations(SBAR). Information from the following report(s) SBAR and Procedure Summary was reviewed with the receiving nurse. Opportunity for questions and clarification was provided. 1415 Received pt via stretcher awake and alert in NAD. Dressing to penis intact with scant dried blood at tip. Houston draining clear yellow urine. Oriented to call bell, phone and IS with pt giving return demonstration.

## 2018-06-14 NOTE — ANESTHESIA PREPROCEDURE EVALUATION
Anesthetic History   No history of anesthetic complications            Review of Systems / Medical History  Patient summary reviewed and pertinent labs reviewed    Pulmonary  Within defined limits          Asthma        Neuro/Psych   Within defined limits           Cardiovascular  Within defined limits  Hypertension      CHF    CAD and CABG    Exercise tolerance: <4 METS     GI/Hepatic/Renal  Within defined limits       Renal disease: CRI       Endo/Other  Within defined limits      Arthritis and cancer     Other Findings   Comments: Documentation of current medication  Current medications obtained, documented and obtained? YES      Risk Factors for Postoperative nausea/vomiting:       History of postoperative nausea/vomiting? NO       Female? NO       Motion sickness? NO       Intended opioid administration for postoperative analgesia? YES      Smoking Abstinence:  Current Smoker? NO  Elective Surgery? YES  Seen preoperatively by anesthesiologist or proxy prior to day of surgery? YES  Pt abstained from smoking 24 hours prior to anesthesia?  YES    Preventive care/screening for High Blood Pressure:  Aged 18 years and older: YES  Screened for high blood pressure: YES  Patients with high blood pressure referred to primary care provider   for BP management: YES                   Physical Exam    Airway  Mallampati: II  TM Distance: 4 - 6 cm  Neck ROM: normal range of motion   Mouth opening: Normal     Cardiovascular    Rhythm: regular  Rate: normal         Dental    Dentition: Poor dentition  Comments: Missing several teeth including upper front   Pulmonary  Breath sounds clear to auscultation               Abdominal  GI exam deferred       Other Findings            Anesthetic Plan    ASA: 3  Anesthesia type: general          Induction: Intravenous  Anesthetic plan and risks discussed with: Patient

## 2018-06-14 NOTE — ANESTHESIA POSTPROCEDURE EVALUATION
Post-Anesthesia Evaluation and Assessment    Patient: Shoshana Duane MRN: [id number]  SSN: [id number]    YOB: 1950  Age: 79 y.o. Sex: male       Cardiovascular Function/Vital Signs  Visit Vitals    BP (!) 164/109    Pulse 86    Temp 36.9 °C (98.4 °F)    Resp 11    Ht 5' 10.5\" (1.791 m)    Wt 97.3 kg (214 lb 8 oz)    SpO2 98%    BMI 30.34 kg/m2       Patient is status post general anesthesia for Procedure(s):  inflatable PENILE PROSTHESIS implantation . Nausea/Vomiting: None    Postoperative hydration reviewed and adequate. Pain:  Pain Scale 1: Numeric (0 - 10) (06/14/18 0755)  Pain Intensity 1: 0 (06/14/18 0755)   Managed    Neurological Status:   Neuro (WDL): Within Defined Limits (06/14/18 0756)   At baseline    Mental Status and Level of Consciousness: Arousable    Pulmonary Status:   O2 Device: Oxygen mask (06/14/18 1156)   Adequate oxygenation and airway patent    Complications related to anesthesia: None    Post-anesthesia assessment completed.  No concerns    Signed By: Delfina Pike MD     June 14, 2018

## 2018-06-14 NOTE — IP AVS SNAPSHOT
[facility] 
 
 
 [address] Patient: Pamela Adams MRN: [id number] WDB:[id number] About your hospitalization You were admitted on:  June 14, 2018 You last received care in the:  [address] [place] [address],[address] You were discharged on:  More 15, 2018 Why you were hospitalized Your primary diagnosis was:  Not on File Your diagnoses also included:  Erectile Dysfunction Follow-up Information Follow up With Details Comments Contact Info Justyna Boss MD Call  [address] [place] [address] 
[phone] MD [facility] [id number] [phone] Your Scheduled Appointments Monday June 25, 2018  8:30 AM EDT  
POST OP with Justyna Boss MD  
[facility]. [address] ([address] [place] [address]) [address] [address]  
[phone] Monday July 09, 2018 11:15 AM EDT  
POST OP with Justyna Boss MD  
[facility]. [address] ([address] [place] [address]) [address] [address]  
[phone] Monday July 23, 2018 11:15 AM EDT  
POST OP with Justyna Boss MD  
[facility]. [address] ([address] [place] [address]) [address] [address]  
[phone] Discharge Orders None A check selam indicates which time of day the medication should be taken. My Medications START taking these medications Instructions Each Dose to Equal  
 Morning Noon Evening Bedtime  
 ciprofloxacin HCl 500 mg tablet Commonly known as:  CIPRO Your last dose was: Your next dose is: Take 1 Tab by mouth two (2) times a day for 14 days. 500 mg  
    
   
   
   
  
 docusate sodium 100 mg capsule Commonly known as:  Celina Sauce Your last dose was: Your next dose is: Take 1 Cap by mouth daily for 30 days. Continue while on narcotic pain medication to help prevent constipation 100 mg CHANGE how you take these medications Instructions Each Dose to Equal  
 Morning Noon Evening Bedtime  
 oxyCODONE-acetaminophen 5-325 mg per tablet Commonly known as:  PERCOCET What changed:   
- when to take this 
- reasons to take this 
- additional instructions Your last dose was: Your next dose is: Take 1 Tab by mouth every four (4) hours as needed for Pain. Max Daily Amount: 6 Tabs. May take 2 tabs for severe pain. Avoid driving while on this medication. Do not exceed 4000mg of acetaminophen per day 1 Tab CONTINUE taking these medications Instructions Each Dose to Equal  
 Morning Noon Evening Bedtime  
 atorvastatin 80 mg tablet Commonly known as:  LIPITOR Your last dose was: Your next dose is:    
   
   
      
   
   
   
  
 carvedilol 6.25 mg tablet Commonly known as:  Yimi Gomez Your last dose was: Your next dose is:    
   
   
      
   
   
   
  
 clopidogrel 75 mg Tab Commonly known as:  PLAVIX Your last dose was: Your next dose is: FLOMAX 0.4 mg capsule Generic drug:  tamsulosin Your last dose was: Your next dose is: Take 1 Cap by mouth two (2) times a day. 0.4 mg  
    
   
   
   
  
 lisinopril 2.5 mg tablet Commonly known as:  Kacey Flight Your last dose was: Your next dose is: Take 1 Tab by mouth daily. [12:00 PM]  Indications: CHRONIC HEART FAILURE  
 2.5 mg  
    
   
   
   
  
 montelukast 10 mg tablet Commonly known as:  SINGULAIR Your last dose was: Your next dose is: Take 10 mg by mouth nightly.   
 10 mg  
    
   
   
   
  
 SYMBICORT 160-4.5 mcg/actuation Hfaa  
 Generic drug:  budesonide-formoterol Your last dose was: Your next dose is:    
   
   
      
   
   
   
  
 umeclidinium 62.5 mcg/actuation inhaler Commonly known as:  INCRUSE ELLIPTA Your last dose was: Your next dose is: Take  inhaled by mouth. VENTOLIN HFA 90 mcg/actuation inhaler Generic drug:  albuterol Your last dose was: Your next dose is: Take 2 Puffs by inhalation every six (6) hours as needed for Wheezing. 2 Puff Where to Get Your Medications Information on where to get these meds will be given to you by the nurse or doctor. ! Ask your nurse or doctor about these medications  
  ciprofloxacin HCl 500 mg tablet  
 docusate sodium 100 mg capsule  
 oxyCODONE-acetaminophen 5-325 mg per tablet Opioid Education Prescription Opioids: What You Need to Know: 
 
 
 
F-face looks uneven A-arms unable to move or move unevenly S-speech slurred or non-existent T-time-call 911 as soon as signs and symptoms begin-DO NOT go Back to bed or wait to see if you get better-TIME IS BRAIN. Warning Signs of HEART ATTACK Call 911 if you have these symptoms: 
? Chest discomfort. Most heart attacks involve discomfort in the center of the chest that lasts more than a few minutes, or that goes away and comes back. It can feel like uncomfortable pressure, squeezing, fullness, or pain. ? Discomfort in other areas of the upper body. Symptoms can include pain or discomfort in one or both arms, the back, neck, jaw, or stomach. ? Shortness of breath with or without chest discomfort. ? Other signs may include breaking out in a cold sweat, nausea, or lightheadedness. Don't wait more than five minutes to call [address]! Fast action can save your life. Calling 911 is almost always the fastest way to get lifesaving treatment. Emergency Medical Services staff can begin treatment when they arrive  up to an hour sooner than if someone gets to the hospital by car. The discharge information has been reviewed with the patient. The patient verbalized understanding. Discharge medications reviewed with the patient and appropriate educational materials and side effects teaching were provided. Patient armband removed and shredded. ___________________________________________________________________________________________________________________________________ Alternate pain medication with ibuprofen. You may restart Plavix in 48 hours. Starrucca Ring Keep wounds clean and dry. Avoid bathing/swimming until otherwise cleared by Dr. Neena Moran. You may shower tomorrow. [employer] Announcement We are excited to announce that we are making your provider's discharge notes available to you in [employer]. You will see these notes when they are completed and signed by the physician that discharged you from your recent hospital stay. If you have any questions or concerns about any information you see in InNetworkt, please call the Health Information Department where you were seen or reach out to your Primary Care Provider for more information about your plan of care. Introducing [facility]! Estimado Dennis Brian Villanueva por solicitar jose cuenta de MyChart [place]orence. Nuestros registros indican que usted ya tiene jose cuenta MyChart [place].  Usted puede acceder a callejas cuenta en cualquier momento en [email or web address] Sabía usted que usted puede acceder a callejas hospital y las instrucciones de reece ER en cualquier momento en MyChart ? También puede revisar [employer] de las pruebas de callejas hospitalización o visita a urgencias . Información Adicional 
 
Si tiene alguna pregunta , por favor visite la sección de preguntas frecuentes del sitio web MyChart en [email or web address] . Recuerde, MyChart NO es que se utilizará para las necesidades urgentes. Para emergencias médicas , llame al 911 . Ahora disponible en callejas iPhone y Android ! Introducing Param Mahmood As a [facility] patient, I wanted to make you aware of our electronic visit tool called Param Mahmood. [employer] allows you to connect within minutes with a medical provider 24 hours a day, seven days a week via a mobile device or tablet or logging into a secure website from your computer. You can access Param Mahmood from anywhere in the [place]. A virtual visit might be right for you when you have a simple condition and feel like you just dont want to get out of bed, or cant get away from work for an appointment, when your regular [facility] provider is not available (evenings, weekends or holidays), or when youre out of town and need minor care. Electronic visits cost only $49 and if the [employer] provider determines a prescription is needed to treat your condition, one can be electronically transmitted to a nearby pharmacy*. Please take a moment to enroll today if you have not already done so. The enrollment process is free and takes just a few minutes. To enroll, please download the hiyalife melissa to your tablet or phone, or visit [email or web address] to enroll on your computer.    
And, as an [address] [place] [address] patient with a [employer] account, the results of your visits will be scanned into your electronic medical record and your primary care provider will be able to view the scanned results. We urge you to continue to see your regular [facility] provider for your ongoing medical care. And while your primary care provider may not be the one available when you seek a AGNIT[employer]O virtual visit, the peace of mind you get from getting a real diagnosis real time can be priceless. For more information on [employer], view our Frequently Asked Questions (FAQs) at [email or web address]. Sincerely, 
 
Aimee Perkins MD 
Chief Medical Officer [facility] Jenna Santamaria *:  certain medications cannot be prescribed via AGNITiO Unresulted tests-please follow up with your PCP on these results Procedure/Test Authorizing Provider Osman Rock MD  
 PLATELET COUNT Vasquez Sylvester MD  
  
Providers Seen During Your Hospitalization Provider Specialty Primary office phone Vasquez Sylvester MD Urology [phone] Your Primary Care Physician (PCP) Primary Care Physician Office Phone Office Fax Odessa Wild [phone] You are allergic to the following No active allergies Recent Documentation Height Weight BMI Smoking Status 1.791 m 97.3 kg 30.34 kg/m2 Former Smoker Emergency Contacts Name Discharge Info Relation Home Work Mobile JoseAmira DISCHARGE CAREGIVER [3] Spouse [3] [phone] Patient Belongings The following personal items are in your possession at time of discharge: 
  Dental Appliances: None  Visual Aid: None      Home Medications: None   Jewelry: None  Clothing: Pants, Shirt, Footwear, Undergarments    Other Valuables: None Discharge Instructions Attachments/References WOUND CARE (Colombian) LAXATIVE, STIMULANT COMBINATION (ORAL) (Colombian) CIPROFLOXACIN (ORAL) (Colombian) Patient Handouts Wound Care: After Your Visit Your Care Instructions Taking good care of your wound at home will help it heal quickly and reduce your chance of infection. The doctor has checked you carefully, but problems can develop later. If you notice any problems or new symptoms, get medical treatment right away. Follow-up care is a key part of your treatment and safety. Be sure to make and go to all appointments, and call your doctor if you are having problems. It's also a good idea to know your test results and keep a list of the medicines you take. How can you care for yourself at home? · Clean the area with soap and water 2 times a day unless your doctor gives you different instructions. Don't use hydrogen peroxide or alcohol, which can slow healing. ¨ You may cover the wound with a thin layer of antibiotic ointment, such as bacitracin, and a nonstick bandage. ¨ Apply more ointment and replace the bandage as needed. · Take pain medicines exactly as directed. Some pain is normal with a wound, but do not ignore pain that is getting worse instead of better. You could have an infection. ¨ If the doctor gave you a prescription medicine for pain, take it as prescribed. ¨ If you are not taking a prescription pain medicine, ask your doctor if you can take an over-the-counter medicine. · Your doctor may have closed your wound with stitches (sutures), staples, or skin glue. ¨ If you have stitches, your doctor may remove them after several days to 2 weeks. Or you may have stitches that dissolve on their own. ¨ If you have staples, your doctor may remove them after 7 to 10 days. ¨ If your wound was closed with skin glue, the glue will wear off in a few days to 2 weeks. When should you call for help? Call your doctor now or seek immediate medical care if: 
· You have signs of infection, such as: 
¨ Increased pain, swelling, warmth, or redness near the wound. ¨ Red streaks leading from the wound. ¨ Pus draining from the wound. ¨ A fever. · You bleed so much from your incision that you soak one or more bandages over 2 to 4 hours. Watch closely for changes in your health, and be sure to contact your doctor if: · The wound is not getting better each day. Where can you learn more? Go to GeneCapture.be Enter M289 in the search box to learn more about \"Wound Care: After Your Visit. \"  
© 9099-3073 Healthwise, Incorporated. Care instructions adapted under license by [employer] (which disclaims liability or warranty for this information). This care instruction is for use with your licensed healthcare professional. If you have questions about a medical condition or this instruction, always ask your healthcare professional. [place] [address] any warranty or liability for your use of this information. Content Version: 91.0.555962; Last Revised: April 23, 2012 Laxative, Stimulant Combination (By mouth) Treats constipation by helping you have a bowel movement. Brand Name(s): Colace, Doc-Q-Lax, Dok Plus, [facility] Stool Softener & Laxative, Good Sense Stimulant Laxative Plus, Laxacin, Medi-Laxx, Elisabeth-Colace, [facility] [employer] Laxative and Stool Softener, [facility] P Col-Rite, Senexon-S, Senna-S, Senna-Time S, SennaLax-S, SennaPrompt There may be other brand names for this medicine. When This Medicine Should Not Be Used: You should not use this medicine if you have had an allergic reaction to senna, sennosides, docusate, casanthranol, or psyllium. Some brand names for these medicines are Correctol® stool softener, Ex-Lax®, Colace®, or Metamucil®. Make sure your doctor knows if you are allergic to any other laxative medicines. How to Use This Medicine:  
Tablet, Liquid Filled Capsule, Liquid, Granule, Capsule, Powder for Suspension · Your doctor will tell you how much medicine to use. Do not use more than directed. · If you have had a sudden change in your bowel movements in the past two weeks, ask your doctor before using this medicine. · Follow the instructions on the medicine label if you are using this medicine without a prescription. · Drink a full glass of water when you take this medicine. One full glass of water is about 8 ounces or 1 cup. Drinking 6 to 8 full glasses of water every day will help keep your bowel movements soft. · You might need to mix the granules or powder with water before you take each dose. Drink this mixture right away. Do not swallow the granules or powder dry unless the directions say you can. · Measure the oral liquid medicine with a marked measuring spoon, oral syringe, or medicine cup. · Use this medicine at bedtime, unless your doctor tells you otherwise. If a dose is missed: · Take a dose as soon as you remember. If it is almost time for your next dose, wait until then and take a regular dose. Do not take extra medicine to make up for a missed dose. How to Store and Dispose of This Medicine: · Store the medicine in a closed container at room temperature, away from heat, moisture, and direct light. · Ask your pharmacist, doctor, or health caregiver about the best way to dispose of any outdated medicine or medicine no longer needed. · Keep all medicine out of the reach of children. Never share your medicine with anyone. Drugs and Foods to Avoid: Ask your doctor or pharmacist before using any other medicine, including over-the-counter medicines, vitamins, and herbal products. · Make sure your doctor knows if you are also using mineral oil. · Some laxatives need to be taken 2 hours before or 2 hours after other medicines. If you need to take any other medicine, ask your health caregiver if you need to follow a special schedule. Warnings While Using This Medicine: · Make sure your doctor knows if you are pregnant or breast feeding. · Do not use this medicine if you have stomach pain, nausea, or vomiting, unless your health caregiver tells you to use it. · If you do not have a bowel movement after using this medicine, talk to your doctor. Most people will have a bowel movement within 6 to 12 hours after using this laxative. · You should not use this laxative for more than 1 week unless your doctor says it is okay. Laxatives may be habit-forming and can harm your bowels if you use them too long. Possible Side Effects While Using This Medicine:  
Call your doctor right away if you notice any of these side effects: · Bleeding from your rectum. · Skin rash. · Urine turns a different color. If you notice these less serious side effects, talk with your doctor: · Diarrhea, cramps, nausea, burping. If you notice other side effects that you think are caused by this medicine, tell your doctor. Call your doctor for medical advice about side effects. You may report side effects to FDA at 3-216-FDA-6519 © 2017 [address]  Information is for End User's use only and may not be sold, redistributed or otherwise used for commercial purposes. The above information is an  only. It is not intended as medical advice for individual conditions or treatments. Talk to your doctor, nurse or pharmacist before following any medical regimen to see if it is safe and effective for you. Ciprofloxacin (By mouth) Ciprofloxacin (tdb-[id number]-OTBU-a-sin) Treats infections and plague. This medicine is a quinolone antibiotic. Brand Name(s): Cipro There may be other brand names for this medicine. When This Medicine Should Not Be Used: This medicine is not right for everyone. Do not use it if you had an allergic reaction to ciprofloxacin or to similar medicines. How to Use This Medicine:  
Liquid, Tablet, Long Acting Tablet · Your doctor will tell you how much medicine to use.  Do not use more than directed. Take this medicine at the same time each day. · You may take this medicine with or without food. Do not take this medicine with only a source of calcium, including milk, yogurt, or juice that contains added calcium. You may have foods or drinks that contain calcium as part of a larger meal. 
· Swallow the extended-release tablet whole. Do not crush, break, or chew it. · Oral liquid: Shake for at least 15 seconds just before each use. The liquid has small beads floating in it. Do not chew the beads when you drink the liquid. Measure the oral liquid medicine with a marked measuring spoon, oral syringe, or medicine cup. · Tablet: Swallow whole. Do not break, crush, or chew it. · Drink extra fluids so you will urinate more often and help prevent kidney problems. · Take all of the medicine in your prescription to clear up your infection, even if you feel better after the first few doses. · This medicine should come with a Medication Guide. Ask your pharmacist for a copy if you do not have one. · Missed dose: Take a dose as soon as you remember. If it is almost time for your next dose, wait until then and take a regular dose. Do not take extra medicine to make up for a missed dose. · Store the medicine in a closed container at room temperature, away from heat, moisture, and direct light. Throw away any leftover liquid medicine after 14 days. Drugs and Foods to Avoid: Ask your doctor or pharmacist before using any other medicine, including over-the-counter medicines, vitamins, and herbal products. · Do not use this medicine together with tizanidine. · Some foods and medicines can affect how ciprofloxacin works. Tell your doctor if you are using any of the following: ¨ Clozapine, cyclosporine, duloxetine, lidocaine, methotrexate, olanzapine, pentoxifylline, phenytoin, probenecid, ropinirole, sildenafil, theophylline, zolpidem ¨ Antibiotic (including azithromycin, clarithromycin, erythromycin) ¨ Blood thinner (including warfarin) ¨ Diabetes medicine (including glimepiride, glyburide) ¨ Medicine for depression or mental illness ¨ Medicine for heart rhythm problems (including amiodarone, procainamide, quinidine, sotalol) ¨ NSAID pain medicine (including aspirin, celecoxib, diclofenac, ibuprofen, naproxen) ¨ Steroid medicine (including hydrocortisone, methylprednisolone, prednisone) · Take ciprofloxacin at least 2 hours before or 6 hours after you take didanosine buffered tablets for oral suspension or the pediatric powder for oral suspension, sucralfate, or antacids, multivitamins, or other products containing aluminum, magnesium, lanthanum, sevelamer, iron, or zinc. 
· This medicine slows the digestion of caffeine, so it might affect you for longer than normal. 
Warnings While Using This Medicine: · Tell your doctor if you are pregnant or breastfeeding, or if you have kidney disease, liver disease, diabetes, heart disease, myasthenia gravis, or a history of heart rhythm problems (including prolonged QT interval), nerve problems, or seizures. Tell your doctor if you have ever had tendon or joint problems, including rheumatoid arthritis, or if you have received a transplant. · This medicine may cause the following problems: 
¨ Tendinitis and tendon rupture (which may happen after treatment ends) ¨ Liver damage ¨ Nerve damage in the arms or legs ¨ Heart rhythm changes ¨ Changes in blood sugar levels · This medicine may make you dizzy, drowsy, or lightheaded. Do not drive or do anything else that could be dangerous until you know how this medicine affects you. · This medicine can cause diarrhea. Call your doctor if the diarrhea becomes severe, does not stop, or is bloody. Do not take any medicine to stop diarrhea until you have talked to your doctor. Diarrhea can occur 2 months or more after you stop taking this medicine. · This medicine may make your skin more sensitive to sunlight.  Wear sunscreen. Do not use sunlamps or tanning beds. · Call your doctor if your symptoms do not improve or if they get worse. · Keep all medicine out of the reach of children. Never share your medicine with anyone. Possible Side Effects While Using This Medicine:  
Call your doctor right away if you notice any of these side effects: · Allergic reaction: Itching or hives, swelling in your face or hands, swelling or tingling in your mouth or throat, chest tightness, trouble breathing · Blistering, peeling, red skin rash · Dark urine, pale stools, nausea, vomiting, loss of appetite, stomach pain, yellow skin or eyes · Diarrhea which may contain blood · Fainting, dizziness, or lightheadedness · Fast, slow, or uneven heartbeat · Numbness, tingling, weakness, or burning pain in your hands, arms, legs, or feet · Pain, stiffness, swelling, or bruises around your ankle, leg, shoulder, or other joints · Seizures, severe headache, unusual thoughts or behaviors, trouble sleeping, feeling anxious, confused, or depressed, seeing, hearing, or feeling things that are not there · Unusual bleeding, bruising, or weakness If you notice other side effects that you think are caused by this medicine, tell your doctor. Call your doctor for medical advice about side effects. You may report side effects to FDA at 5-280-FDA-3850 © 2017 [address] [place] Information is for End User's use only and may not be sold, redistributed or otherwise used for commercial purposes. The above information is an  only. It is not intended as medical advice for individual conditions or treatments. Talk to your doctor, nurse or pharmacist before following any medical regimen to see if it is safe and effective for you. Please provide this summary of care documentation to your next provider.  
  
  
 
  
Signatures-by signing, you are acknowledging that this After Visit Summary has been reviewed with you and you have received a copy. Patient Signature:  ____________________________________________________________ Date:  ____________________________________________________________  
  
Parveen Brake Provider Signature:  ____________________________________________________________ Date:  ____________________________________________________________  
  
  
   
  
[address] 
 
 
 [address] 
[phone] Patient: Be Arita MRN: [id number] MOM:[id number] Sobre jordan hospitalización Le admitieron el:  June 14, 2018 Jordan tratamiento más reciente fue el:  [facility] [facility] Le dieron de reece el:  More 15, 2018 Por qué le ingresaron Jordan diagnosis primaria es:  No está archivado/a Jordan diagnosis también incluye:  Erectile Dysfunction Follow-up Information Follow up With Details Comments Contact Info Hiram Quiroz MD Call  [address] [place] [address] [phone] MD Mark Rouse MD [place] [address] [address] [phone] Your Scheduled Appointments Monday June 25, 2018  8:30 AM EDT  
POST OP with Hiram Quiroz MD  
[facility]. [address] ([address] [place] [address]) [address] [address]  
[phone] Monday July 09, 2018 11:15 AM EDT  
POST OP with Hiram Quiroz MD  
[facility]. Garry Shen 98 ([address] [place] [address]) [address] [address]  
[phone] Monday July 23, 2018 11:15 AM EDT  
POST OP with Hiram Quiroz MD  
[facility]. Garry Shen 98 ([address] [place] [address]) [address] [address]  
[phone] Discharge Orders uTrail me A check selam indicates which time of day the medication should be taken. My Medications EMPIECE a braxton FOOTBEAT & AVEX Health Instructions Each Dose to Equal  
 Morning Noon Evening Bedtime  
 ciprofloxacin HCl 500 mg tablet También conocido cecilia:  CIPRO Your last dose was: Your next dose is: Take 1 Tab by mouth two (2) times a day for 14 days. 500 mg  
    
   
   
   
  
 docusate sodium 100 mg capsule También conocido cecilia:  Ruchi Martin Your last dose was: Your next dose is: Take 1 Cap by mouth daily for 30 days. Continue while on narcotic pain medication to help prevent constipation 100 mg  
    
   
   
   
  
  
CAMBIE la forma de PATHEOS Instructions Each Dose to Equal  
 Morning Noon Evening Bedtime  
 oxyCODONE-acetaminophen 5-325 mg per tablet También conocido cecilia:  PERCOCET Lo que cambió:   
- cuándo lo debe braxton 
- razones para tomarlo 
- instrucciones adicionales Your last dose was: Your next dose is: Take 1 Tab by mouth every four (4) hours as needed for Pain. Max Daily Amount: 6 Tabs. May take 2 tabs for severe pain. Avoid driving while on this medication. Do not exceed 4000mg of acetaminophen per day 1 Tab SIGA tomando estos medicamentos Instructions Each Dose to Equal  
 Morning Noon Evening Bedtime  
 atorvastatin 80 mg tablet También conocido cecilia:  LIPITOR Your last dose was: Your next dose is:    
   
   
      
   
   
   
  
 carvedilol 6.25 mg tablet También conocido cecilia:  COREG Your last dose was: Your next dose is:    
   
   
      
   
   
   
  
 clopidogrel 75 mg Tab También conocido cecilia:  PLAVIX Your last dose was: Your next dose is: FLOMAX 0.4 mg capsule Medicamento genérico:  tamsulosin Your last dose was: Your next dose is: Take 1 Cap by mouth two (2) times a day. 0.4 mg  
    
   
   
   
  
 lisinopril 2.5 mg tablet También conocido cecilia:  Bart Selby Your last dose was: Your next dose is: Take 1 Tab by mouth daily. [12:00 PM]  Indications: CHRONIC HEART FAILURE  
 2.5 mg  
    
   
   
   
  
 montelukast 10 mg tablet También conocido cecilia:  SINGULAIR Your last dose was: Your next dose is: Take 10 mg by mouth nightly. 10 mg  
    
   
   
   
  
 SYMBICORT 160-4.5 mcg/actuation Hfaa Medicamento genérico:  budesonide-formoterol Your last dose was: Your next dose is:    
   
   
      
   
   
   
  
 umeclidinium 62.5 mcg/actuation inhaler También conocido cecilia:  INCRUSE ELLIPTA Your last dose was: Your next dose is: Take  inhaled by mouth. VENTOLIN HFA 90 mcg/actuation inhaler Medicamento genérico:  albuterol Your last dose was: Your next dose is: Take 2 Puffs by inhalation every six (6) hours as needed for Wheezing. 2 Puff  
    
   
   
   
  
  
  
Dónde puede recoger delia medicamentos Information on where to get these meds will be given to you by the nurse or doctor. ! Pregunte a callejas médico o enfermero/a sobre estos medicamentos  
  ciprofloxacin HCl 500 mg tablet  
 docusate sodium 100 mg capsule  
 oxyCODONE-acetaminophen 5-325 mg per tablet Opioid Education Prescription Opioids: What You Need to Know: 
 
 
 
F-face looks uneven A-arms unable to move or move unevenly S-speech slurred or non-existent T-time-call 911 as soon as signs and symptoms begin-DO NOT go Back to bed or wait to see if you get better-TIME IS BRAIN. Warning Signs of HEART ATTACK Call 911 if you have these symptoms: 
? Chest discomfort. Most heart attacks involve discomfort in the center of the chest that lasts more than a few minutes, or that goes away and comes back. It can feel like uncomfortable pressure, squeezing, fullness, or pain. ? Discomfort in other areas of the upper body. Symptoms can include pain or discomfort in one or both arms, the back, neck, jaw, or stomach. ? Shortness of breath with or without chest discomfort. ? Other signs may include breaking out in a cold sweat, nausea, or lightheadedness. Don't wait more than five minutes to call [address]! Fast action can save your life. Calling 911 is almost always the fastest way to get lifesaving treatment. Emergency Medical Services staff can begin treatment when they arrive  up to an hour sooner than if someone gets to the hospital by car. The discharge information has been reviewed with the patient. The patient verbalized understanding. Discharge medications reviewed with the patient and appropriate educational materials and side effects teaching were provided. Patient armband removed and shredded. ___________________________________________________________________________________________________________________________________ Alternate pain medication with ibuprofen. You may restart Plavix in 48 hours. Camden Li Keep wounds clean and dry. Avoid bathing/swimming until otherwise cleared by Dr. Bree Meier. You may shower tomorrow. Fybert Announcement We are excited to announce that we are making your provider's discharge notes available to you in Fybert.   You will see these notes when they are completed and signed by the physician that discharged you from your recent hospital stay. If you have any questions or concerns about any information you see in [employer], please call the Health Information Department where you were seen or reach out to your Primary Care Provider for more information about your plan of care. Introducing [address]! Estimado Lino Ledesmaerene Dears por solicitar jose cuenta de MyChart usted. Nuestros registros indican que usted ya tiene jose cuenta MyChart [place]. Usted puede acceder a callejas cuenta en cualquier momento en [email or web address] Sabía usted que usted puede acceder a callejas hospital y las instrucciones de reece ER en cualquier momento en MyChart ? También puede revisar [facility] de las pruebas de callejas hospitalización o visita a urgencias . Información Adicional 
 
Si tiene alguna pregunta , por favor visite la sección de preguntas frecuentes del sitio web MyChart en [email or web address] . Recuerde, MyChart NO es que se utilizará para las necesidades urgentes. Para emergencias médicas , llame al 911 . Ahora disponible en callejas iPhone y Android ! Introducing Param Mahmood As a [employer] patient, I wanted to make you aware of our electronic visit tool called Param Mahmood. [employer] 24/7 allows you to connect within minutes with a medical provider 24 hours a day, seven days a week via a mobile device or tablet or logging into a secure website from your computer. You can access Param Mahmood from anywhere in the [place]. A virtual visit might be right for you when you have a simple condition and feel like you just dont want to get out of bed, or cant get away from work for an appointment, when your regular [employer] provider is not available (evenings, weekends or holidays), or when youre out of town and need minor care.   Electronic visits cost only $49 and if the [employer] 24/7 provider determines a prescription is needed to treat your condition, one can be electronically transmitted to a nearby pharmacy*. Please take a moment to enroll today if you have not already done so. The enrollment process is free and takes just a few minutes. To enroll, please download the [employer] 24/7 melissa to your tablet or phone, or visit [email or web address] to enroll on your computer. And, as an [address] [place] [address] patient with a [employer] account, the results of your visits will be scanned into your electronic medical record and your primary care provider will be able to view the scanned results. We urge you to continue to see your regular [employer] provider for your ongoing medical care. And while your primary care provider may not be the one available when you seek a Intense[facility]ate virtual visit, the peace of mind you get from getting a real diagnosis real time can be priceless. For more information on IntenseDebate, view our Frequently Asked Questions (FAQs) at [email or web address]. Sincerely, 
 
Lois Ritter MD 
Chief Medical Officer [facility] Jenna Rosalio *:  certain medications cannot be prescribed via IntenseDebate Unresulted tests-please follow up with your PCP on these results Procedimiento/Prueba Autorizada por Carlton Tavares MD  
 PLATELET COUNT Deidre San MD  
  
Providers Seen During Your Hospitalization Personal Médico Especialidad Teléfono principal de la oficina Deidre San MD Urology [phone] Jordan médico de atención primaria (PCP ) Primary Care Physician Office Phone Office Fax Rudy Merrill [phone] Tiene alergias a lo siguiente No tiene alergias Documentación recientes Height Weight BMI (IMC) Estatus de tabaquísmo 1.791 m 97.3 kg 30.34 kg/m2 Former Smoker Emergency Contacts Name Discharge Info Relation Home Work Mobile Amira Garcia DISCHARGE CAREGIVER [3] Spouse [3] [phone] Patient Belongings The following personal items are in your possession at time of discharge: 
  Dental Appliances: None  Visual Aid: None      Home Medications: None   Jewelry: None  Clothing: Pants, Shirt, Footwear, Undergarments    Other Valuables: None Discharge Instructions Attachments/References WOUND CARE (Bahraini) LAXATIVE, STIMULANT COMBINATION (ORAL) (Bahraini) CIPROFLOXACIN (ORAL) (Bahraini) Patient Handouts Cuidado de heridas: Después de la consulta [Wound Care: After Your Visit] Instrucciones de cuidado Cuidar jose roberto callejas herida en el [facility] ayuda a que esta sane rápidamente y reduce la probabilidad de infección. El médico lo blank revisado minuciosamente, carlo puede desarrollar problemas más tarde. Si nota algún problema o síntomas nuevos, busque tratamiento médico inmediatamente. La atención de seguimiento es jose parte clave de callejas tratamiento y seguridad. Asegúrese de hacer y acudir a todas las citas, y llame a callejas médico si está teniendo problemas. También es jose buena idea saber los resultados de los exámenes y mantener jose lista de los medicamentos que andres. Cómo puede cuidarse en el [facility]? · Limpie la esdras con agua y jabón 2 veces al día, a menos que callejas médico le haya dado otras indicaciones. No use peróxido de hidrógeno [place]) ni alcohol porque pueden retardar la sanación. ¨ Puede cubrirse la herida con jose capa delgada de pomada antibiótica, cecilia bacitracina, y Tawny Miners venda antiadherente. ¨ Aplíquese más pomada y cambie la venda cuando sea necesario. · [employer] analgésicos (medicamentos para el dolor) exactamente según las indicaciones. Sentir un poco de dolor es normal cuando se tiene jose herida, carlo no ignore un dolor que empeora en vez de mejorar. Podría tener jose infección. ¨ Si el médico le recetó un analgésico, tómelo según las indicaciones. ¨ Si no está tomando un analgésico recetado, pregúntele a jordan médico si puede braxton jose rafael de [employer]. · Jordan médico puede haberle cerrado la herida con puntos (suturas), grapas o pegamento para la piel. ¨ Si tiene puntos, jordan médico puede sacárselos después de varios días o 2 semanas. O es posible que usted tenga puntos que se disuelven solos. ¨ Si tiente grapas, jordan médico puede sacárselas después de 7 o 10 días. ¨ Si le camacho cerrado la herida con pegamento para la piel, el pegamento se irá entre unos días y 2 semanas. Cuándo debe pedir ayuda? Llame a jordan médico ahora mismo o busque atención médica inmediata si: · [address], tales cecilia: ¨ Aumento del dolor, la hinchazón, calor o enrojecimiento cerca de la herida. ¨ Vetas rojizas que salen de la herida. ¨ Pus que supura de la herida. Tommas Nicely. · Usted sangra tanto de la incisión que empapa jose o más vendas entre 2 y 4 horas. Preste especial atención a los cambios en jordan catalina y asegúrese de comunicarse con jordan médico si: 
· La herida no mejora cada día que pasa. Dónde puede encontrar más información en inglés? Tresa Burns a [email or web address].be Escriba M973 en la búsqueda para aprender más acerca de \"Cuidado de heridas: Después de la consulta. \"  
© 7156-8227 Healthwise, Incorporated. Instrucciones de cuidado adaptadas bajo licencia por Kary Rolle (which disclaims liability or warranty for this information). Estas instrucciones de cuidado son para usarlas con jordan profesional clínico registrado. Si tiene preguntas acerca de jose afección médica o de estas instrucciones, pregunte siempre a jordan profesional de [employer]. Healthwise, Incorporated niega cualquier garantía o responsabilidad por jordan uso de esta información. Versión del contenido: [id number]; Última revisión: 23 pastora, 2012 Combinación laxante - estimulante (Por la boca) Trata estreñimiento ayudando a que usted tenga jose evacuación intestinal.  
Herb(s) : Colace, Doc-Q-Lax, Dok Plus, [facility] Stool Softener & Laxative, Good Sense Stimulant Laxative Plus, Laxacin, Medi-Laxx, Elisabeth-Colace, [facility] Laxative and Stool Softener, [facility] P Col-Rite, Senexon-S, Senna-S, Senna-Time S, SennaLax-S, SennaPrompt Existen muchas otras marcas de Dueñas. Fidelina medicamento no debe ser usado cuando:  
No use fidelina medicamento si alguna vez ha tenido jose reacción alérgica al sen, a los senósidos, al docusato, casantranol o psyllium. Algunos nombres para estos medicamentos son:Correctol® emoliente de excremento, Ex-Lax®, Colace® o Metamucil®. No olvide informarle a callejas médico si usted sufre de alergia a cualquier medicamento laxante. [place] de usar fidelina medicamento:  
[place] Monk haneyo, Danilo Griggs [place], [place] para suspensión · Callejas médico le indicara cuanto medicamento necesita usar. No use más medicamento de lo indicado. · Si en las [employer], usted ha tenido un cambio súbito en delia evacuaciones intestinales, consulte con callejas médico antes de usar Dueñas. · [address] medicamento si usted está usando fidelina medicamento sin prescripción médica. · Nadege un vaso lleno de agua al braxton Dueñas. Un vaso lleno de agua equivale a cerca de 8 onzas o a jose taza. Beber diariamente entre 6 y 8 vasos llenos de agua le ayudará a ablandar delia evacuaciones intestinales. · Puede ser necesario que usted [employer] gránulos o el polvo con agua antes de braxton cada dosis. Nadege la mezcla inmediatamente. No trague los gránulos o polvo seco, a menos que las instrucciones dicen que se puede. · Mida el líquido oral con Rolena Harsh, [place] para uso oral o taza especialmente marcadas para medir medicamentos. · Citigroup medicamento a la hora de [place], a no ser que callejas médico le ordene lo contrario. Si jose dosis es olvidada: · Si olvida jose dosis de callejas medicamento, tómelo lo más pronto posible. Si es beata la hora para callejas próxima dosis, espere hasta entonces para braxton callejas dosis regular. No use medicamento adicional para reponer la dosis olvidada. Forma de guardar y botar fidelina medicamento: · Guarde el medicamento en un recipiente cerrado a temperatura ambiente y alejado del calor, la humedad y la daphnie directa. · [address] [facility] de vencimiento haya expirado y las medicinas que ya no necesita siguiendo las instrucciones del farmacéutico, médico o paramédico. 
· Guarde todos los medicamentos fuera del alcance de los niños. Nunca comparta delia medicamentos con [employer]. Medicamentos y [employer] que debe evitar:  
Consulte con callejas médico o farmacéutico antes de usar cualquier medicamento, incluyendo los que compra sin receta médica, las vitaminas y los productos herbales. · No olvide informarle a callejas médico si también está usando aceite mineral. 
· Algunos laxantes necesitan ser usados 2 horas antes o 2 horas después de los otros medicamentos. Si necesita usar cualquier otro medicamento, pregúntele a callejas médico si es necesario que usted siga un horario especial. 
Precauciones darell el uso de Chris medicamento: · No olvide informarle a callejas médico si está embarazada o amamantando. · Si usted tiene [employer], nauseas o vómito, no use fidelina medicamento sin la autorización de callejas médico. 
· Consulte con callejas médico, si después de usar Alter-G, usted no ha hecho jose evacuación intestinal. Beata todas las personas realizan jose evacuación intestinal entre las 6 y 12 horas, de stef recibido fidelina laxante. · Usted no debe usar fidelina laxante por más de 1 semana a no ser que callejas médico lo autorice. Los laxantes puede ser adictivos y nocivos para los intestinos si se usan por períodos prolongados. Efectos secundarios que pueden presentarse darell el uso de Chris medicamento: Consulte inmediatamente con el médico si nota cualquiera de estos efectos secundarios: 
· Sangrado de callejas recto. · Sarpullido. · Cambios en el color de la orina. Consulte con el médico si nota los siguientes efectos secundarios menos graves: · Diarrea, cólicos, náuseas, eructos. Consulte con el médico si nota otros efectos secundarios que rajinder son causados por janessa medicamento. Llame a callejas médico para consultarle Martinez. Usted puede notificar delia efectos secundarios al FDA al [id number]. © 2017 [address] [facility] Information is for End User's use only and may not be sold, redistributed or otherwise used for commercial purposes. Esta información es sólo para uso en educación. Callejas intención no es darle un consejo médico sobre enfermedades o tratamientos. Colsulte con callejas [facility] farmacéutico antes de seguir cualquier régimen médico para saber si es seguro y efectivo para usted. Ciprofloxacina (Por la boca) [address] infecciones y la plaga. Janessa medicamento es un antibiótico de [place]. Herb(s) : Cipro Existen muchas otras marcas de [facility]. Janessa medicamento no debe ser usado cuando:  
Janessa medicamento no es adecuado para todas las personas. No lo use si usted ha tenido jose reacción alérgica a la ciprofloxacina o a medicamentos similares. Forma de usar janessa medicamento:  
Augustine Perez, Tableta de liberación prolongada · Callejas médico le indicara cuanto medicamento necesita usar. No use más medicamento de lo indicado. [employer]Ex a la misma hora todos los días. · Usted puede braxton callejas medicamento con o sin comida. No tome janessa medicamento solamente con jose jose e de calcio, cecilia [place], yogur o jugo que contenga calcio adicional. Usted puede consumir alimentos o bebidas que contengan calcio cecilia parte de jose comida Martna. · Trague la tableta de liberación prolongada entera. No triture, rompa o mastique. · Solución oral: Agite el envase darell 15 segundos nanda antes de [address]. El líquido contiene pequeñas bolas. No mastique las bolas al braxton el líquido. Mida el líquido oral con Daisy Bihari, [place] para uso oral o taza especialmente marcadas para medir medicamentos. · Tableta: Tráguela entera. No la [facility], [facility] o [place]. · [employer] Materials líquidos para que pueda orinar con Wendy Stall y ayudar a prevenir problemas en delia riñones. · [place] todo callejas medicamento recetado para eliminar callejas infección por completo aunque usted se sienta mejor después de las primeras dosis. · [facility] debe venir con Jaquita Frames Guía del medicamento. Solicite jose copia con callejas farmacéutico en cem de no tener la guía. · Si olvida jose dosis: Si olvida jose dosis de callejas medicamento, tómelo lo más pronto posible. Si es guerita la hora para callejas próxima dosis, espere hasta entonces para braxton callejas dosis regular. No use medicamento adicional para reponer la dosis olvidada. · Guarde el medicamento en un recipiente cerrado a temperatura ambiente y alejado del calor, la humedad y la daphnie directa. Deseche todo el medicamento líquido sobrante después de 14 días. Medicamentos y [employer] que debe evitar:  
Consulte con callejas médico o farmacéutico antes de usar cualquier medicamento, incluyendo los que compra sin receta médica, las vitaminas y los productos herbales. · No use fidelina medicamento junto con tizanidina. · Algunos medicamentos y alimentos pueden afectar la eficacia de la ciprofloxacina. Informe a callejas médico si está usando cualquiera de los siguientes: ¨ Clozapina, ciclosporina, duloxetina, lidocaína, metotrexato, olanzapina, pentoxifilina, fenitoína, probenecida, ropinirol, sildenafil, teofilina, zolpidem ¨ Antibióticos (incluyen azitromicina, claritromicina, eritromicina, gatifloxacina, moxifloxacina) ¨ Anticoagulantes (incluyendo Kvng Keegan) ¨ Medicamentos para la diabetes (incluyendo glimepirida, glipizida, gliburida) ¨ Medicamentos para la depresión o enfermedad mental 
¨ Medicamento para tratar problemas con el ritmo cardíaco (cecilia amiodarona, procainamida, quinidina, sotalol) ¨ Medicamento RAMONA (incluyendo aspirina, celecoxib, diclofenac, ibuprofeno, naproxeno) ¨ Medicamentos esteroideos (incluyendo hidrocortisona, metilprednisolona, prednisona) · [place] ciprofloxacina por lo menos 2 horas antes o 6 horas después de braxton didanosina tamponado con tabletas para suspensión oral o el polvo pediátrico para suspensión oral, sucralfato o antiácidos, multivitaminas, u otros productos que contienen aluminio, magnesio, zackary, lantano, sevelamer, zackary o zinc. 
· Janessa medicamento retarda la digestión de la cafeína, por lo tanto le podría afectar más tiempo de lo habitual. 
Precauciones darell el uso de [place] medicamento: · Informe a callejas médico si usted está embarazada o amamantando, o si tiene enfermedad renal, enfermedad hepática, diabetes, enfermedad del corazón, miastenia grave o antecedentes de problemas con el ritmo cardíaco (cecilia prolongación del intervalo QT), problemas nerviosos o convulsiones. Infórmele a callejas médico si usted alguna vez ha tenido problemas con los tendones o las articulaciones, incluyendo la artritis reumatoide, o si ha recibido un trasplante. · Janessa medicamento puede causar los siguientes problemas: ¨ Tendinitis y rotura del tendón (podría suceder después de terminar el tratamiento) ¨ Daño hepático 
¨ Daño en los nervios de los brazos y piernas ¨ Alteraciones del ritmo cardíaco 
¨ Cambios en el nivel de azúcar en la Torres Martinez · Janessa medicamento puede hacerlo sentir mareado, somnoliento o aturdido. No maneje un vehículo ni allison ninguna tarea que pueda ser peligrosa hasta que usted sepa cómo lo afecta janessa medicamento. · Janessa medicamento puede causar diarrea. Llame a callejas médico si la diarrea se intensifica, no se detiene, o contiene yamel.  No tome ningún medicamento para suspender la diarrea hasta que hable con callejas médico. La diarrea puede ocurrir 2 meces o más después de suspender el uso de Dueñas. · Janessa medicamento puede hacer que callejas piel se vuelva más sensible a la daphnie solar. Use protector solar. No use lámparas ni cámaras bronceadoras. · Llame a callejas médico si delia síntomas no mejoran, o si Jesusita Perish. · Guarde todos los medicamentos fuera del alcance de los niños. Nunca comparta delia medicamentos con [facility]. Efectos secundarios que pueden presentarse darell el uso de janessa medicamento:  
Consulte inmediatamente con el médico si nota cualquiera de estos efectos secundarios: 
· Reacción alérgica: Comezón o ronchas, hinchazón del gladys o las alexia, hinchazón u hormigueo en la boca o garganta, opresión en el pecho, dificultad para respirar · Ampollas, despellejamiento, sarpullido sharma en la piel. · Orina oscura, heces pálidas, náuseas, vómito, falta de apetito, dolor estomacal, coloración amarillenta en la piel u ojos · Diarrea con posible presencia de Dewayne · Desmayos, mareos, o desvanecimiento · Ritmo cardíaco acelerado, lento o irregular · Adormecimiento, hormigueo, debilidad o dolor ardiente en delia alexia, brazos, piernas o pies · Dolor, rigidez, inflamación o moretones alrededor del tobillo, [place], Carlos, u otras articulaciones · Convulsiones, dolor de patricia severo, pensamientos o comportamientos inusuales, problemas para dormir, ansiedad, confusión o depresión, melanie, oír o sentir cosas que no existen · Sangrados, moretones o debilidad inusuales. Consulte con el médico si nota otros efectos secundarios que rajinder son causados por janessa medicamento. Llame a callejas médico para consultarle Martinez. Usted puede notificar delia efectos secundarios al FDA al [phone].  
© 2017 [address] [employer] Information is for End User's use only and may not be sold, redistributed or otherwise used for commercial purposes. Esta información es sólo para uso en educación. Callejas intención no es darle un consejo médico sobre enfermedades o tratamientos. Colsulte con callejas Joce Federico farmacéutico antes de seguir cualquier régimen médico para saber si es seguro y efectivo para usted. Please provide this summary of care documentation to your next provider. Signatures-by signing, you are acknowledging that this After Visit Summary has been reviewed with you and you have received a copy. Patient Signature:  ____________________________________________________________ Date:  ____________________________________________________________  
  
[facility] Provider Signature:  ____________________________________________________________ Date:  ____________________________________________________________

## 2018-06-15 VITALS
SYSTOLIC BLOOD PRESSURE: 118 MMHG | OXYGEN SATURATION: 94 % | TEMPERATURE: 97.8 F | BODY MASS INDEX: 30.03 KG/M2 | HEIGHT: 71 IN | RESPIRATION RATE: 16 BRPM | DIASTOLIC BLOOD PRESSURE: 70 MMHG | HEART RATE: 76 BPM | WEIGHT: 214.5 LBS

## 2018-06-15 PROCEDURE — 94640 AIRWAY INHALATION TREATMENT: CPT

## 2018-06-15 PROCEDURE — 74011250636 HC RX REV CODE- 250/636

## 2018-06-15 PROCEDURE — 74011250636 HC RX REV CODE- 250/636: Performed by: UROLOGY

## 2018-06-15 PROCEDURE — 74011250637 HC RX REV CODE- 250/637: Performed by: UROLOGY

## 2018-06-15 PROCEDURE — 74011000250 HC RX REV CODE- 250: Performed by: UROLOGY

## 2018-06-15 PROCEDURE — 94760 N-INVAS EAR/PLS OXIMETRY 1: CPT

## 2018-06-15 PROCEDURE — 51798 US URINE CAPACITY MEASURE: CPT

## 2018-06-15 PROCEDURE — 74011000258 HC RX REV CODE- 258

## 2018-06-15 RX ADMIN — SODIUM CHLORIDE 1000 MG: 900 INJECTION, SOLUTION INTRAVENOUS at 06:30

## 2018-06-15 RX ADMIN — Medication 10 ML: at 07:33

## 2018-06-15 RX ADMIN — TOBRAMYCIN SULFATE 371 MG: 40 INJECTION, SOLUTION INTRAMUSCULAR; INTRAVENOUS at 09:30

## 2018-06-15 RX ADMIN — LISINOPRIL 2.5 MG: 5 TABLET ORAL at 08:52

## 2018-06-15 RX ADMIN — OXYCODONE HYDROCHLORIDE AND ACETAMINOPHEN 1 TABLET: 5; 325 TABLET ORAL at 14:11

## 2018-06-15 RX ADMIN — TAMSULOSIN HYDROCHLORIDE 0.4 MG: 0.4 CAPSULE ORAL at 08:52

## 2018-06-15 RX ADMIN — OXYCODONE HYDROCHLORIDE AND ACETAMINOPHEN 2 TABLET: 5; 325 TABLET ORAL at 08:52

## 2018-06-15 RX ADMIN — ARFORMOTEROL TARTRATE 15 MCG: 15 SOLUTION RESPIRATORY (INHALATION) at 08:36

## 2018-06-15 RX ADMIN — OXYCODONE HYDROCHLORIDE AND ACETAMINOPHEN 2 TABLET: 5; 325 TABLET ORAL at 03:30

## 2018-06-15 RX ADMIN — HEPARIN SODIUM 5000 UNITS: 5000 INJECTION, SOLUTION INTRAVENOUS; SUBCUTANEOUS at 06:34

## 2018-06-15 RX ADMIN — CARVEDILOL 6.25 MG: 6.25 TABLET, FILM COATED ORAL at 08:55

## 2018-06-15 NOTE — DISCHARGE INSTRUCTIONS
DISCHARGE SUMMARY from Nurse    PATIENT INSTRUCTIONS:    After general anesthesia or intravenous sedation, for 24 hours or while taking prescription Narcotics:  · Limit your activities  · Do not drive and operate hazardous machinery  · Do not make important personal or business decisions  · Do  not drink alcoholic beverages  · If you have not urinated within 8 hours after discharge, please contact your surgeon on call. Report the following to your surgeon:  · Excessive pain, swelling, redness or odor of or around the surgical area  · Temperature over 100.5  · Nausea and vomiting lasting longer than 4 hours or if unable to take medications  · Any signs of decreased circulation or nerve impairment to extremity: change in color, persistent  numbness, tingling, coldness or increase pain  · Any questions    What to do at Home:  Recommended activity: No heavy lifting, driving or strenuous exercise for 3 weeks. If you experience any of the above symptoms, please follow up with Dr. Yolanda Sanches. *  Please give a list of your current medications to your Primary Care Provider. *  Please update this list whenever your medications are discontinued, doses are      changed, or new medications (including over-the-counter products) are added. *  Please carry medication information at all times in case of emergency situations. These are general instructions for a healthy lifestyle:    No smoking/ No tobacco products/ Avoid exposure to second hand smoke  Surgeon General's Warning:  Quitting smoking now greatly reduces serious risk to your health.     Obesity, smoking, and sedentary lifestyle greatly increases your risk for illness    A healthy diet, regular physical exercise & weight monitoring are important for maintaining a healthy lifestyle    You may be retaining fluid if you have a history of heart failure or if you experience any of the following symptoms:  Weight gain of 3 pounds or more overnight or 5 pounds in a week, increased swelling in our hands or feet or shortness of breath while lying flat in bed. Please call your doctor as soon as you notice any of these symptoms; do not wait until your next office visit. Recognize signs and symptoms of STROKE:    F-face looks uneven    A-arms unable to move or move unevenly    S-speech slurred or non-existent    T-time-call 911 as soon as signs and symptoms begin-DO NOT go       Back to bed or wait to see if you get better-TIME IS BRAIN. Warning Signs of HEART ATTACK     Call 911 if you have these symptoms:   Chest discomfort. Most heart attacks involve discomfort in the center of the chest that lasts more than a few minutes, or that goes away and comes back. It can feel like uncomfortable pressure, squeezing, fullness, or pain.  Discomfort in other areas of the upper body. Symptoms can include pain or discomfort in one or both arms, the back, neck, jaw, or stomach.  Shortness of breath with or without chest discomfort.  Other signs may include breaking out in a cold sweat, nausea, or lightheadedness. Don't wait more than five minutes to call 911 - MINUTES MATTER! Fast action can save your life. Calling 911 is almost always the fastest way to get lifesaving treatment. Emergency Medical Services staff can begin treatment when they arrive -- up to an hour sooner than if someone gets to the hospital by car. The discharge information has been reviewed with the patient. The patient verbalized understanding. Discharge medications reviewed with the patient and appropriate educational materials and side effects teaching were provided. Patient armband removed and shredded. ___________________________________________________________________________________________________________________________________      Alternate pain medication with ibuprofen. You may restart Plavix in 48 hours. Lavada Saucer Keep wounds clean and dry.  Avoid bathing/swimming until otherwise cleared by  Mell. You may shower tomorrow.

## 2018-06-15 NOTE — ANCILLARY DISCHARGE INSTRUCTIONS
Patient and/or next of kin has been given the [facility] Important Message From Medicare About Your Rights\" letter and all questions were answered. Patient signed on paper. Copy given. Hard copy placed in chart.

## 2018-06-15 NOTE — PROGRESS NOTES
Reason for Admission:   Erectile dysfunction PROCEDURE PERFORMED:  Implant of AMS  inflatable penile prosthesis.     IMPLANT:  AMS  inflatable penile prosthesis                   RRAT Score:   18                  Plan for utilizing home health:  As indicated                        Likelihood of Readmission:  low                         Transition of Care Plan:    Home    Interviewed patient, he agrees to share his  Discharge information with his spouse, Simba Cancino . He was independent prior to admission and see Dr Ania Jones for his primary care needs. His discharge plan is to return home             Care Management Interventions  PCP Verified by CM: Yes  Palliative Care Criteria Met (RRAT>21 & CHF Dx)?: No  Mode of Transport at Discharge: Other (see comment) (spouse)  Transition of Care Consult (CM Consult):  Other (home)  MyChart Signup: No  Discharge Durable Medical Equipment: No  Health Maintenance Reviewed: Yes  Physical Therapy Consult: No  Occupational Therapy Consult: No  Speech Therapy Consult: No  Current Support Network: Lives with Spouse  Confirm Follow Up Transport: Family  Plan discussed with Pt/Family/Caregiver: Yes  [place] Resource Information Provided?: No  Discharge Location  Discharge Placement: Home

## 2018-06-15 NOTE — PHYSICIAN ADVISORY
Letter of admission status determination     Khurram Bocanegra   Age: 79 y.o. MRN: [id number]  Admitting physician: Julito Waterman [id number]: Payor: Yesi Sherman / Plan: VA MEDICARE PART A & B / Product Type: Medicare /     Date of admission:  6/14/2018    I have reviewed this case as it involves a Medicare patient not meeting criteria for Inpatient status. The patient underwent elective urologic procedure on 6/14/18. The procedure is not on the current Medicare Inpatient Only List. The patient tolerated the procedure well, without any documented complications, was monitored overnight and has remained stable. There were no unexpected complications to warrant Observation services or Inpatient status. Routine postoperative recovery in outpatient status can be overnight. Therefore, Outpatient status (without Observation services) is appropriate. The final decision regarding the patient's hospitalization status depends on the attending physician's judgment.          Javi Regalado MD, AKILA, [place], [place] [facility]  Physician Surendra Montgomery.  [phone]    More 15, 2018   8:13 AM

## 2018-06-15 NOTE — PROGRESS NOTES
1922 Received patient from [email or web address] Patient is alert and oriented x4.    2200 Patient requested help in getting OOB in order to do bowel movement. 0200 Patient requested fresh Iced drink. 0600 Final check empty galindo. 0820 Bedside and Verbal shift change report given to James Sparks RN (oncoming nurse) by Adele Cantor RN (offgoing nurse). Report included the following information SBAR, Procedure Summary, Intake/Output and MAR. Problem: Falls - Risk of  Goal: *Absence of Falls  Document Albert Fall Risk and appropriate interventions in the flowsheet.    Outcome: Progressing Towards Goal  Fall Risk Interventions:  Mobility Interventions: Patient to call before getting OOB         Medication Interventions: Bed/chair exit alarm    Elimination Interventions: Call light in reach

## 2018-06-15 NOTE — PROGRESS NOTES
Problem: Pressure Injury - Risk of  Goal: *Prevention of pressure injury  Document Berto Scale and appropriate interventions in the flowsheet. Outcome: Progressing Towards Goal  Pressure Injury Interventions:             Activity Interventions: Increase time out of bed    Mobility Interventions: HOB 30 degrees or less    Nutrition Interventions: Document food/fluid/supplement intake

## 2018-06-15 NOTE — PROGRESS NOTES
0800  Received pt on bed, informed about due to void by 12 noon, perineum slightly swollen, ice pack been applied since  Yesterday. 1000  Up  Walking informed about calling me if voided, pain under control. 1145  Voided 350 cc, bladder scan 74    1230  Voided 100cc. Zero bladders scan, pt said he is going home, no official order, called the office, waiting from [facility] to call back    1430  Medicated for pain prior to discharge d/C instructions given by  Abida, IV site no redness and no swelling, verbalized understanding. Awaiting his ride.

## 2018-06-15 NOTE — PROGRESS NOTES
Progress Note    Patient: Khurram Bocanegra MRN: [id number]  SSN: [id number]    YOB: 1950  Age: 79 y.o. Sex: male      Admit Date: 6/14/2018    LOS: 1 day     Subjective:     Doing well, minimal pain. Slept well. No No/Vo. No CP/SOB/LE pain. Objective:     Vitals:    06/14/18 2016 06/14/18 2027 06/14/18 2316 06/15/18 0325   BP: 122/77  120/76 100/67   Pulse: 72  78 76   Resp: 22 17 16   Temp: 97.4 °F (36.3 °C)  97.8 °F (36.6 °C) 98.8 °F (37.1 °C)   SpO2: 96% 96% 96% 95%   Weight:       Height:            Intake and Output:  Current Shift: 06/14 1901 - 06/15 0700  In: -   Out: 100 [Urine:100]  Last three shifts: 06/13 0701 - 06/14 1900  In: 8657 [P.O.:790; I.V.:900]  Out: 450 [Urine:250]    Physical Exam:       Doing well, NAD   Abdo soft, nd/nt    Mummy wrap removed, moderate penoscrotal swelling and ecchymosis incision ok, [place] removed    LE soft and nt x 2                                                   Lab/Data Review: All lab results for the last 24 hours reviewed. Assessment:     Active Problems:    Erectile dysfunction ()    POD1 IPP     Plan:     DC home today after VT and ATB dose  Scripts in chart  All DC instructions given and Qs answered  FUw Dr Laura Torre on July 7th as scheduled, earlier PRN    Signed By: Db Demarco MD     More 15, 2018      I was present and have independently reviewed the diagnosis and discussed the plan with Db Demarco MD , and I agree. Donna Tubbs MD, MD  [facility]  [address] [place] [address].  [facility], [facility] [place]

## 2018-06-15 NOTE — PROGRESS NOTES
conducted an initial consultation and Spiritual Assessment for [facility], who is a 79 y.o.,male. Patients Primary Language is: Mohawk. According to the patients EMR [employer]eterian Affiliation is: Synagogue. The reason the Patient came to the hospital is:   Patient Active Problem List    Diagnosis Date Noted    CAD (coronary artery disease)     CHF (congestive heart failure) ([address]     Heart disease     HTN (hypertension)     PAD (peripheral artery disease) ([address] 08/25/2017    Erectile dysfunction     Hypercholesterolemia     Personal history of prostate cancer     UTI (urinary tract infection)     Kidney stone     Benign hypertensive heart disease with congestive heart failure and with combined systolic and diastolic dysfunction ([address]     Essential hypertension 07/11/2016    History of prostate cancer     History of external beam radiation therapy     Vitamin D deficiency 05/10/2016    S/P CABG x 2     Dyslipidemia     Cardiomyopathy ([address]     Decreased calculated glomerular filtration rate (GFR) 05/06/2016    Status post spinal surgery 04/29/2016    Methicillin resistant Staphylococcus aureus infection 04/29/2016    Osteomyelitis of vertebra of thoracic region ([address] 04/29/2016    Discitis of thoracic region 04/23/2016    Coronary artery disease involving native coronary artery of native heart without angina pectoris 01/16/2016    Malignant neoplasm of prostate ([address]     History of kidney stones     Bronchial asthma     Hypogonadism in male 03/26/2015    History of vitamin D deficiency 09/23/2014    Chronic combined systolic and diastolic heart failure ([address] 04/21/2014    Atherosclerosis of coronary artery bypass graft 04/21/2014    Elevated PSA         The  provided the following Interventions:  Initiated a relationship of care and support. Explored issues of kandice, belief, spirituality and Yarsani/ritual needs while hospitalized.   Listened empathically. Provided chaplaincy education. Provided information about Spiritual Care Services. Offered prayer and assurance of continued prayers on patient's behalf. Chart reviewed. The following outcomes where achieved:  Patient shared limited information about both their medical narrative and spiritual journey/beliefs.  confirmed Patient's Uatsdin Affiliation. Patient processed feeling about current hospitalization. Patient expressed gratitude for 's visit. Assessment:  Patient does not have any Uatsdin/cultural needs that will affect patients preferences in health care. There are no spiritual or Uatsdin issues which require intervention at this time. Patient is friendly and very positive about his current hospital stay. Plan:  Chaplains will continue to follow and will provide pastoral care on an as needed/requested basis.  recommends bedside caregivers page  on duty if patient shows signs of acute spiritual or emotional distress.     [address] Ashley Rodriguez Intern  Spiritual Care   [phone]

## 2019-04-16 ENCOUNTER — HOSPITAL ENCOUNTER (OUTPATIENT)
Dept: LAB | Age: 69
Discharge: HOME | End: 2019-04-16

## 2019-04-16 LAB — SENTARA SPECIMEN COL,SENBCF: NORMAL

## 2019-04-16 PROCEDURE — 99001 SPECIMEN HANDLING PT-LAB: CPT

## 2019-08-27 ENCOUNTER — HOSPITAL ENCOUNTER (OUTPATIENT)
Dept: LAB | Age: 69
Discharge: HOME | End: 2019-08-27

## 2019-08-27 LAB — SENTARA SPECIMEN COL,SENBCF: NORMAL

## 2019-08-27 PROCEDURE — 99001 SPECIMEN HANDLING PT-LAB: CPT

## 2019-12-04 ENCOUNTER — HOSPITAL ENCOUNTER (OUTPATIENT)
Dept: LAB | Age: 69
Discharge: HOME | End: 2019-12-04

## 2019-12-04 LAB — SENTARA SPECIMEN COL,SENBCF: NORMAL

## 2019-12-04 PROCEDURE — 99001 SPECIMEN HANDLING PT-LAB: CPT

## 2020-01-10 ENCOUNTER — HOSPITAL ENCOUNTER (OUTPATIENT)
Dept: LAB | Age: 70
Discharge: HOME | End: 2020-01-10

## 2020-01-10 LAB — SENTARA SPECIMEN COL,SENBCF: NORMAL

## 2020-01-10 PROCEDURE — 99001 SPECIMEN HANDLING PT-LAB: CPT

## 2020-05-04 ENCOUNTER — HOSPITAL ENCOUNTER (OUTPATIENT)
Dept: LAB | Age: 70
Discharge: HOME | End: 2020-05-04

## 2020-05-04 LAB — SENTARA SPECIMEN COL,SENBCF: NORMAL

## 2020-05-04 PROCEDURE — 99001 SPECIMEN HANDLING PT-LAB: CPT

## 2020-07-09 ENCOUNTER — HOSPITAL ENCOUNTER (OUTPATIENT)
Dept: LAB | Age: 70
Discharge: HOME | End: 2020-07-09

## 2020-07-09 LAB — SENTARA SPECIMEN COL,SENBCF: NORMAL

## 2020-07-09 PROCEDURE — 99001 SPECIMEN HANDLING PT-LAB: CPT

## 2020-07-13 ENCOUNTER — HOSPITAL ENCOUNTER (OUTPATIENT)
Dept: CT IMAGING | Age: 70
Discharge: HOME | End: 2020-07-13
Attending: INTERNAL MEDICINE
Payer: MEDICARE

## 2020-07-13 DIAGNOSIS — R10.32 LEFT LOWER QUADRANT PAIN: ICD-10-CM

## 2020-07-13 PROCEDURE — 74011000255 HC RX REV CODE- 255: Performed by: INTERNAL MEDICINE

## 2020-07-13 PROCEDURE — 74176 CT ABD & PELVIS W/O CONTRAST: CPT

## 2020-07-13 RX ORDER — BARIUM SULFATE 20 MG/ML
900 SUSPENSION ORAL
Status: COMPLETED | OUTPATIENT
Start: 2020-07-13 | End: 2020-07-13

## 2020-07-13 RX ADMIN — BARIUM SULFATE 900 ML: 20 SUSPENSION ORAL at 09:31

## 2020-10-21 ENCOUNTER — HOSPITAL ENCOUNTER (OUTPATIENT)
Dept: LAB | Age: 70
Discharge: HOME | End: 2020-10-21

## 2020-10-21 LAB — SENTARA SPECIMEN COL,SENBCF: NORMAL

## 2020-10-21 PROCEDURE — 99001 SPECIMEN HANDLING PT-LAB: CPT

## 2021-03-16 ENCOUNTER — HOSPITAL ENCOUNTER (OUTPATIENT)
Dept: LAB | Age: 71
Discharge: HOME | End: 2021-03-16

## 2021-03-16 LAB — SENTARA SPECIMEN COL,SENBCF: NORMAL

## 2021-03-16 PROCEDURE — 99001 SPECIMEN HANDLING PT-LAB: CPT

## 2021-07-30 ENCOUNTER — HOSPITAL ENCOUNTER (OUTPATIENT)
Dept: LAB | Age: 71
Discharge: HOME | End: 2021-07-30

## 2021-07-30 LAB — SENTARA SPECIMEN COL,SENBCF: NORMAL

## 2021-07-30 PROCEDURE — 99001 SPECIMEN HANDLING PT-LAB: CPT

## 2021-08-03 PROBLEM — I25.10 CAD (CORONARY ARTERY DISEASE): Status: RESOLVED | Noted: 2021-08-03 | Resolved: 2021-08-03

## 2021-10-21 ENCOUNTER — HOSPITAL ENCOUNTER (OUTPATIENT)
Dept: LAB | Age: 71
Discharge: HOME | End: 2021-10-21

## 2021-10-21 LAB — SENTARA SPECIMEN COL,SENBCF: NORMAL

## 2021-10-21 PROCEDURE — 99001 SPECIMEN HANDLING PT-LAB: CPT

## 2022-03-08 ENCOUNTER — HOSPITAL ENCOUNTER (OUTPATIENT)
Dept: LAB | Age: 72
Discharge: HOME | End: 2022-03-08

## 2022-03-08 LAB — SENTARA SPECIMEN COL,SENBCF: NORMAL

## 2022-03-08 PROCEDURE — 99001 SPECIMEN HANDLING PT-LAB: CPT

## 2022-03-18 PROBLEM — I73.9 PAD (PERIPHERAL ARTERY DISEASE) (HCC): Status: ACTIVE | Noted: 2017-08-25

## 2022-09-02 ENCOUNTER — HOSPITAL ENCOUNTER (OUTPATIENT)
Dept: LAB | Age: 72
Discharge: HOME | End: 2022-09-02

## 2022-09-02 LAB — SENTARA SPECIMEN COL,SENBCF: NORMAL

## 2022-09-02 PROCEDURE — 99001 SPECIMEN HANDLING PT-LAB: CPT

## 2023-02-24 ENCOUNTER — HOSPITAL ENCOUNTER (OUTPATIENT)
Facility: HOSPITAL | Age: 73
Discharge: HOME | End: 2023-02-27

## 2023-02-24 LAB — SENTARA SPECIMEN COLLECTION: NORMAL

## 2023-02-24 PROCEDURE — 99001 SPECIMEN HANDLING PT-LAB: CPT

## 2023-03-23 PROBLEM — I25.10 CORONARY ARTERY DISEASE INVOLVING NATIVE CORONARY ARTERY OF NATIVE HEART WITHOUT ANGINA PECTORIS: Status: ACTIVE | Noted: 2022-08-22

## 2023-03-23 PROBLEM — I25.5 ISCHEMIC CARDIOMYOPATHY: Status: ACTIVE | Noted: 2022-08-22

## 2023-11-29 LAB — LEFT VENTRICULAR EJECTION FRACTION, EXTERNAL: 35

## 2024-01-01 ENCOUNTER — TELEPHONE (OUTPATIENT)
Facility: CLINIC | Age: 74
End: 2024-01-01

## 2024-01-30 ENCOUNTER — OFFICE VISIT (OUTPATIENT)
Facility: CLINIC | Age: 74
End: 2024-01-30
Payer: MEDICARE

## 2024-01-30 VITALS
HEART RATE: 95 BPM | HEIGHT: 71 IN | TEMPERATURE: 98 F | WEIGHT: 226 LBS | DIASTOLIC BLOOD PRESSURE: 69 MMHG | BODY MASS INDEX: 31.64 KG/M2 | RESPIRATION RATE: 16 BRPM | SYSTOLIC BLOOD PRESSURE: 132 MMHG | OXYGEN SATURATION: 98 %

## 2024-01-30 DIAGNOSIS — I50.42 CHRONIC COMBINED SYSTOLIC AND DIASTOLIC CONGESTIVE HEART FAILURE (HCC): ICD-10-CM

## 2024-01-30 DIAGNOSIS — R12 HEARTBURN: ICD-10-CM

## 2024-01-30 DIAGNOSIS — C61 MALIGNANT NEOPLASM OF PROSTATE (HCC): ICD-10-CM

## 2024-01-30 DIAGNOSIS — N18.31 STAGE 3A CHRONIC KIDNEY DISEASE (HCC): ICD-10-CM

## 2024-01-30 DIAGNOSIS — F33.42 RECURRENT MAJOR DEPRESSIVE DISORDER, IN FULL REMISSION (HCC): ICD-10-CM

## 2024-01-30 DIAGNOSIS — G47.33 OSA (OBSTRUCTIVE SLEEP APNEA): ICD-10-CM

## 2024-01-30 DIAGNOSIS — J45.40 MODERATE PERSISTENT ASTHMA, UNSPECIFIED WHETHER COMPLICATED: ICD-10-CM

## 2024-01-30 DIAGNOSIS — I25.810 CORONARY ARTERY DISEASE INVOLVING CORONARY BYPASS GRAFT OF NATIVE HEART WITHOUT ANGINA PECTORIS: ICD-10-CM

## 2024-01-30 DIAGNOSIS — R73.03 PREDIABETES: ICD-10-CM

## 2024-01-30 DIAGNOSIS — Z76.89 ENCOUNTER TO ESTABLISH CARE: Primary | ICD-10-CM

## 2024-01-30 DIAGNOSIS — I10 PRIMARY HYPERTENSION: ICD-10-CM

## 2024-01-30 DIAGNOSIS — Z98.890 STATUS POST SPINAL SURGERY: ICD-10-CM

## 2024-01-30 DIAGNOSIS — J06.9 UPPER RESPIRATORY TRACT INFECTION, UNSPECIFIED TYPE: ICD-10-CM

## 2024-01-30 PROCEDURE — 3075F SYST BP GE 130 - 139MM HG: CPT | Performed by: STUDENT IN AN ORGANIZED HEALTH CARE EDUCATION/TRAINING PROGRAM

## 2024-01-30 PROCEDURE — 99204 OFFICE O/P NEW MOD 45 MIN: CPT | Performed by: STUDENT IN AN ORGANIZED HEALTH CARE EDUCATION/TRAINING PROGRAM

## 2024-01-30 PROCEDURE — G8417 CALC BMI ABV UP PARAM F/U: HCPCS | Performed by: STUDENT IN AN ORGANIZED HEALTH CARE EDUCATION/TRAINING PROGRAM

## 2024-01-30 PROCEDURE — G8427 DOCREV CUR MEDS BY ELIG CLIN: HCPCS | Performed by: STUDENT IN AN ORGANIZED HEALTH CARE EDUCATION/TRAINING PROGRAM

## 2024-01-30 PROCEDURE — 3017F COLORECTAL CA SCREEN DOC REV: CPT | Performed by: STUDENT IN AN ORGANIZED HEALTH CARE EDUCATION/TRAINING PROGRAM

## 2024-01-30 PROCEDURE — 3078F DIAST BP <80 MM HG: CPT | Performed by: STUDENT IN AN ORGANIZED HEALTH CARE EDUCATION/TRAINING PROGRAM

## 2024-01-30 PROCEDURE — 1036F TOBACCO NON-USER: CPT | Performed by: STUDENT IN AN ORGANIZED HEALTH CARE EDUCATION/TRAINING PROGRAM

## 2024-01-30 PROCEDURE — G8484 FLU IMMUNIZE NO ADMIN: HCPCS | Performed by: STUDENT IN AN ORGANIZED HEALTH CARE EDUCATION/TRAINING PROGRAM

## 2024-01-30 PROCEDURE — 1123F ACP DISCUSS/DSCN MKR DOCD: CPT | Performed by: STUDENT IN AN ORGANIZED HEALTH CARE EDUCATION/TRAINING PROGRAM

## 2024-01-30 RX ORDER — SERTRALINE HYDROCHLORIDE 100 MG/1
50 TABLET, FILM COATED ORAL DAILY
Qty: 45 TABLET | Refills: 1 | Status: SHIPPED | OUTPATIENT
Start: 2024-01-30

## 2024-01-30 RX ORDER — MONTELUKAST SODIUM 10 MG/1
10 TABLET ORAL NIGHTLY
Qty: 90 TABLET | Refills: 1 | Status: SHIPPED | OUTPATIENT
Start: 2024-01-30

## 2024-01-30 RX ORDER — TAMSULOSIN HYDROCHLORIDE 0.4 MG/1
0.4 CAPSULE ORAL 2 TIMES DAILY
Qty: 180 CAPSULE | Refills: 1 | Status: SHIPPED | OUTPATIENT
Start: 2024-01-30

## 2024-01-30 RX ORDER — CARVEDILOL 6.25 MG/1
6.25 TABLET ORAL 2 TIMES DAILY WITH MEALS
Qty: 60 TABLET | Refills: 2 | Status: SHIPPED | OUTPATIENT
Start: 2024-01-30

## 2024-01-30 RX ORDER — FUROSEMIDE 40 MG/1
40 TABLET ORAL 2 TIMES DAILY
Qty: 180 TABLET | Refills: 1 | Status: SHIPPED | OUTPATIENT
Start: 2024-01-30

## 2024-01-30 RX ORDER — TRAZODONE HYDROCHLORIDE 100 MG/1
100 TABLET ORAL NIGHTLY
Qty: 90 TABLET | Refills: 1 | Status: SHIPPED | OUTPATIENT
Start: 2024-01-30

## 2024-01-30 RX ORDER — BUDESONIDE AND FORMOTEROL FUMARATE DIHYDRATE 160; 4.5 UG/1; UG/1
2 AEROSOL RESPIRATORY (INHALATION) 2 TIMES DAILY
Qty: 10.2 G | Refills: 1 | Status: SHIPPED | OUTPATIENT
Start: 2024-01-30

## 2024-01-30 RX ORDER — FUROSEMIDE 40 MG/1
40 TABLET ORAL 2 TIMES DAILY
COMMUNITY
End: 2024-01-30 | Stop reason: SDUPTHER

## 2024-01-30 RX ORDER — OMEPRAZOLE 40 MG/1
40 CAPSULE, DELAYED RELEASE ORAL EVERY MORNING
Qty: 90 CAPSULE | Refills: 1 | Status: SHIPPED | OUTPATIENT
Start: 2024-01-30

## 2024-01-30 RX ORDER — SPIRONOLACTONE 25 MG/1
25 TABLET ORAL DAILY
COMMUNITY
End: 2024-01-30 | Stop reason: SDUPTHER

## 2024-01-30 RX ORDER — AZITHROMYCIN 250 MG/1
250 TABLET, FILM COATED ORAL SEE ADMIN INSTRUCTIONS
Qty: 6 TABLET | Refills: 0 | Status: SHIPPED | OUTPATIENT
Start: 2024-01-30 | End: 2024-02-04

## 2024-01-30 RX ORDER — ALBUTEROL SULFATE 90 UG/1
2 AEROSOL, METERED RESPIRATORY (INHALATION) EVERY 6 HOURS PRN
Qty: 18 G | Refills: 2 | Status: SHIPPED | OUTPATIENT
Start: 2024-01-30

## 2024-01-30 RX ORDER — SPIRONOLACTONE 25 MG/1
25 TABLET ORAL DAILY
Qty: 90 TABLET | Refills: 1 | Status: SHIPPED | OUTPATIENT
Start: 2024-01-30

## 2024-01-30 RX ORDER — CLOPIDOGREL BISULFATE 75 MG/1
75 TABLET ORAL DAILY
Qty: 30 TABLET | Refills: 2 | Status: SHIPPED | OUTPATIENT
Start: 2024-01-30

## 2024-01-30 RX ORDER — ROSUVASTATIN CALCIUM 40 MG/1
TABLET, COATED ORAL
Qty: 90 TABLET | Refills: 1 | Status: SHIPPED | OUTPATIENT
Start: 2024-01-30

## 2024-01-30 SDOH — ECONOMIC STABILITY: FOOD INSECURITY: WITHIN THE PAST 12 MONTHS, YOU WORRIED THAT YOUR FOOD WOULD RUN OUT BEFORE YOU GOT MONEY TO BUY MORE.: NEVER TRUE

## 2024-01-30 SDOH — ECONOMIC STABILITY: FOOD INSECURITY: WITHIN THE PAST 12 MONTHS, THE FOOD YOU BOUGHT JUST DIDN'T LAST AND YOU DIDN'T HAVE MONEY TO GET MORE.: NEVER TRUE

## 2024-01-30 SDOH — ECONOMIC STABILITY: HOUSING INSECURITY
IN THE LAST 12 MONTHS, WAS THERE A TIME WHEN YOU DID NOT HAVE A STEADY PLACE TO SLEEP OR SLEPT IN A SHELTER (INCLUDING NOW)?: NO

## 2024-01-30 SDOH — ECONOMIC STABILITY: INCOME INSECURITY: HOW HARD IS IT FOR YOU TO PAY FOR THE VERY BASICS LIKE FOOD, HOUSING, MEDICAL CARE, AND HEATING?: NOT HARD AT ALL

## 2024-01-30 ASSESSMENT — PATIENT HEALTH QUESTIONNAIRE - PHQ9
1. LITTLE INTEREST OR PLEASURE IN DOING THINGS: 0
SUM OF ALL RESPONSES TO PHQ QUESTIONS 1-9: 0
2. FEELING DOWN, DEPRESSED OR HOPELESS: 0
SUM OF ALL RESPONSES TO PHQ9 QUESTIONS 1 & 2: 0
SUM OF ALL RESPONSES TO PHQ QUESTIONS 1-9: 0

## 2024-01-30 ASSESSMENT — ENCOUNTER SYMPTOMS
CONSTIPATION: 0
VOMITING: 0
COUGH: 1
EYE REDNESS: 0
DIARRHEA: 0
SHORTNESS OF BREATH: 0
ABDOMINAL PAIN: 0
BACK PAIN: 0
EYE ITCHING: 0
COLOR CHANGE: 0
EYE DISCHARGE: 0
SORE THROAT: 1
EYE PAIN: 0
FACIAL SWELLING: 0

## 2024-01-30 NOTE — PROGRESS NOTES
Monroe Orellana is a 73 y.o. year old male who presents today for   Chief Complaint   Patient presents with    New Patient       Is someone accompanying this pt? no    Is the patient using any DME equipment during OV? no    Depression Screening:        No data to display                Abuse Screenin/30/2024    10:00 AM   AMB Abuse Screening   Do you ever feel afraid of your partner? N   Are you in a relationship with someone who physically or mentally threatens you? N   Is it safe for you to go home? Y       Learning Assessment:  No question data found.    Fall Risk:       No data to display                    Coordination of Care:   1. \"Have you been to the ER, urgent care clinic since your last visit?  Hospitalized since your last visit?\" no    2. \"Have you seen or consulted any other health care providers outside of the [facility] since your last visit?\" no    3. For patients aged 45-75: Has the patient had a colonoscopy / FIT/ Cologuard? Not due    If the patient is female:    4. For patients aged 40-74: Has the patient had a mammogram within the past 2 years? NA    5. For patients aged 21-65: Has the patient had a pap smear? NA    Health Maintenance: reviewed and discussed and ordered per Provider.    Health Maintenance Due   Topic Date Due    COVID-19 Vaccine (1) Never done    Pneumococcal 65+ years Vaccine (1 - PCV) 1956    Lipids  Never done    Depression Screen  Never done    GFR test (Diabetes, CKD 3-4, OR last GFR 15-59)  Never done    Hepatitis C screen  Never done    DTaP/Tdap/Td vaccine (1 - Tdap) Never done    Shingles vaccine (1 of 2) Never done    Respiratory Syncytial Virus (RSV) Pregnant or age 60 yrs+ (1 - 1-dose 60+ series) Never done    Flu vaccine (1) 2023        - Janice Szymanski LPN  [facility]  Phone: [phone]  Fax: [phone]

## 2024-01-30 NOTE — PROGRESS NOTES
Monroe Orellana is a 73 y.o.  male and presents with    Chief Complaint   Patient presents with    New Patient           Subjective:    Pt is here to establish care w/ HX of COPD/ASthma, chronic systolic heart failure, CAD - CABG x2, HTN, hLD, GERD     Has been having sore throat. He feels like he has pain swallowing saliva in th AM. Has been having cough for about 1 week too. It is intermittent. It is positive for phlegm but he feels this has been going on for a long time.     He was being treated by Dr. Caleb Munoz. He states he was dx w/ asthma since he was young. He does not have a pulmonologist.     He had MI in 2009, and had CABG x2 in [facility]. Then about 2 months ago he was admitted due to acute exacerbation of CHF. Echo in 11/2023 showed EF of 35%, moderate pulm htn, mild MR, TR. F/up w/ Cardiology specialist. During that time he had bradycardia d/t complete heart block he was s/p CRT and ICD placement on 12/1/2023    In hospital notes it also states there is suspicion of GAL.     Pt states he was dx w/ asthma when he was younger. Former smoker, so possibility of CPOD. Takes symbicort    Pt had hx of prostate cancer in 2014, seems to have resolved. F/up w/ urology    About 4 yrs ago he had back surgery d/t some fracture vertebraes per pt.   Patient Active Problem List   Diagnosis    Hypogonadism in male    S/P CABG x 2    Methicillin resistant Staphylococcus aureus infection    Benign hypertensive heart disease with congestive heart failure and with combined systolic and diastolic dysfunction (HCC)    Kidney stone    History of prostate cancer    Bronchial asthma    Decreased calculated glomerular filtration rate (GFR)    PAD (peripheral artery disease) (HCC)    History of external beam radiation therapy    CHF (congestive heart failure) (HCC)    Personal history of prostate cancer    Cardiomyopathy (HCC)    Discitis of thoracic region    Vitamin D deficiency    Malignant neoplasm of prostate (HCC)

## 2024-02-27 ENCOUNTER — OFFICE VISIT (OUTPATIENT)
Facility: CLINIC | Age: 74
End: 2024-02-27
Payer: MEDICARE

## 2024-02-27 VITALS
HEIGHT: 71 IN | OXYGEN SATURATION: 98 % | HEART RATE: 85 BPM | DIASTOLIC BLOOD PRESSURE: 79 MMHG | RESPIRATION RATE: 13 BRPM | BODY MASS INDEX: 32.62 KG/M2 | WEIGHT: 233 LBS | SYSTOLIC BLOOD PRESSURE: 125 MMHG | TEMPERATURE: 98.4 F

## 2024-02-27 DIAGNOSIS — I73.9 PAD (PERIPHERAL ARTERY DISEASE) (HCC): ICD-10-CM

## 2024-02-27 DIAGNOSIS — T17.308A CHOKING, INITIAL ENCOUNTER: ICD-10-CM

## 2024-02-27 DIAGNOSIS — R10.13 DYSPEPSIA: Primary | ICD-10-CM

## 2024-02-27 PROCEDURE — 99214 OFFICE O/P EST MOD 30 MIN: CPT | Performed by: STUDENT IN AN ORGANIZED HEALTH CARE EDUCATION/TRAINING PROGRAM

## 2024-02-27 PROCEDURE — G8417 CALC BMI ABV UP PARAM F/U: HCPCS | Performed by: STUDENT IN AN ORGANIZED HEALTH CARE EDUCATION/TRAINING PROGRAM

## 2024-02-27 PROCEDURE — 3074F SYST BP LT 130 MM HG: CPT | Performed by: STUDENT IN AN ORGANIZED HEALTH CARE EDUCATION/TRAINING PROGRAM

## 2024-02-27 PROCEDURE — G8427 DOCREV CUR MEDS BY ELIG CLIN: HCPCS | Performed by: STUDENT IN AN ORGANIZED HEALTH CARE EDUCATION/TRAINING PROGRAM

## 2024-02-27 PROCEDURE — 3078F DIAST BP <80 MM HG: CPT | Performed by: STUDENT IN AN ORGANIZED HEALTH CARE EDUCATION/TRAINING PROGRAM

## 2024-02-27 PROCEDURE — G8484 FLU IMMUNIZE NO ADMIN: HCPCS | Performed by: STUDENT IN AN ORGANIZED HEALTH CARE EDUCATION/TRAINING PROGRAM

## 2024-02-27 PROCEDURE — 3017F COLORECTAL CA SCREEN DOC REV: CPT | Performed by: STUDENT IN AN ORGANIZED HEALTH CARE EDUCATION/TRAINING PROGRAM

## 2024-02-27 PROCEDURE — 1123F ACP DISCUSS/DSCN MKR DOCD: CPT | Performed by: STUDENT IN AN ORGANIZED HEALTH CARE EDUCATION/TRAINING PROGRAM

## 2024-02-27 PROCEDURE — 1036F TOBACCO NON-USER: CPT | Performed by: STUDENT IN AN ORGANIZED HEALTH CARE EDUCATION/TRAINING PROGRAM

## 2024-02-27 ASSESSMENT — ENCOUNTER SYMPTOMS
VOMITING: 0
ABDOMINAL PAIN: 0
SHORTNESS OF BREATH: 0
CONSTIPATION: 0
EYE PAIN: 0
EYE DISCHARGE: 0
EYE REDNESS: 0
DIARRHEA: 0
COLOR CHANGE: 0
BACK PAIN: 0
EYE ITCHING: 0
FACIAL SWELLING: 0

## 2024-02-27 NOTE — PROGRESS NOTES
Monroe Orellana is a 73 y.o. year old male who presents today for   Chief Complaint   Patient presents with    Follow-up       Is someone accompanying this pt? No     Is the patient using any DME equipment during OV? No     Depression Screenin/30/2024    10:31 AM   PHQ-9 Questionaire   Little interest or pleasure in doing things 0   Feeling down, depressed, or hopeless 0   PHQ-9 Total Score 0       Abuse Screenin/30/2024    10:00 AM   AMB Abuse Screening   Do you ever feel afraid of your partner? N   Are you in a relationship with someone who physically or mentally threatens you? N   Is it safe for you to go home? Y       Learning Assessment:  No question data found.    Fall Risk:      2024    10:35 AM   Fall Risk   2 or more falls in past year? no   Fall with injury in past year? no           Coordination of Care:   1. \"Have you been to the ER, urgent care clinic since your last visit?  Hospitalized since your last visit?\" No     2. \"Have you seen or consulted any other health care providers outside of the [facility] since your last visit?\" No     3. For patients aged 45-75: Has the patient had a colonoscopy / FIT/ Cologuard? Not due     If the patient is female:    4. For patients aged 40-74: Has the patient had a mammogram within the past 2 years? N/A    5. For patients aged 21-65: Has the patient had a pap smear? N/A    Health Maintenance: reviewed and discussed and ordered per Provider.    Health Maintenance Due   Topic Date Due    COVID-19 Vaccine (1) Never done    Pneumococcal 65+ years Vaccine (1 - PCV) 1956    Lipids  Never done    GFR test (Diabetes, CKD 3-4, OR last GFR 15-59)  Never done    Hepatitis C screen  Never done    DTaP/Tdap/Td vaccine (1 - Tdap) Never done    Shingles vaccine (1 of 2) Never done    Respiratory Syncytial Virus (RSV) Pregnant or age 60 yrs+ (1 - 1-dose 60+ series) Never done    Flu vaccine (1) 2023    Annual Wellness Visit

## 2024-02-27 NOTE — PROGRESS NOTES
Monroe Orellana is a 73 y.o.  male and presents with    Chief Complaint   Patient presents with    Follow-up           Subjective:    Took Omeprazole for 4 weeks, but he states it did not help. He continues to have the feeling like he has something blocking in his throat from time to time.  If he presses on his throat or moves it form side to side he feels slightly.     Has appt with sleep medicine in the upcoming weeks.     Patient Active Problem List   Diagnosis    Hypogonadism in male    S/P CABG x 2    Methicillin resistant Staphylococcus aureus infection    Benign hypertensive heart disease with congestive heart failure and with combined systolic and diastolic dysfunction (HCC)    Kidney stone    History of prostate cancer    Bronchial asthma    Decreased calculated glomerular filtration rate (GFR)    PAD (peripheral artery disease) (HCC)    History of external beam radiation therapy    CHF (congestive heart failure) (HCC)    Personal history of prostate cancer    Cardiomyopathy (HCC)    Discitis of thoracic region    Vitamin D deficiency    Malignant neoplasm of prostate (HCC)    HTN (hypertension)    UTI (urinary tract infection)    History of vitamin D deficiency    Atherosclerosis of coronary artery bypass graft    Chronic systolic heart failure (HCC)    Coronary artery disease involving coronary bypass graft of native heart without angina pectoris    Heart disease    Erectile dysfunction    Hypercholesterolemia    Dyslipidemia    Status post spinal surgery    Osteomyelitis of vertebra of thoracic region (HCC)    Elevated PSA    History of kidney stones    Ischemic cardiomyopathy    Coronary artery disease involving native coronary artery of native heart without angina pectoris      Past Medical History:   Diagnosis Date    Asthma     CAD (coronary artery disease)     CHF (congestive heart failure) (HCC)     Coronary artery disease involving native coronary artery of native heart without angina

## 2024-02-28 LAB
A/G RATIO: 1.3 RATIO (ref 1.1–2.6)
ALBUMIN SERPL-MCNC: 4.4 G/DL (ref 3.5–5)
ALP BLD-CCNC: 93 U/L (ref 40–125)
ALT SERPL-CCNC: 14 U/L (ref 5–40)
ANION GAP SERPL CALCULATED.3IONS-SCNC: 12 MMOL/L (ref 3–15)
AST SERPL-CCNC: 17 U/L (ref 10–37)
BILIRUB SERPL-MCNC: 0.3 MG/DL (ref 0.2–1.2)
BUN BLDV-MCNC: 21 MG/DL (ref 6–22)
CALCIUM SERPL-MCNC: 9.6 MG/DL (ref 8.4–10.5)
CHLORIDE BLD-SCNC: 99 MMOL/L (ref 98–110)
CHOLESTEROL/HDL RATIO: 3.4 (ref 0–5)
CHOLESTEROL: 135 MG/DL (ref 110–200)
CO2: 29 MMOL/L (ref 20–32)
CREAT SERPL-MCNC: 1.4 MG/DL (ref 0.8–1.6)
ESTIMATED AVERAGE GLUCOSE: 137 MG/DL (ref 91–123)
GLOBULIN: 3.3 G/DL (ref 2–4)
GLOMERULAR FILTRATION RATE: 52.2 ML/MIN/1.73 SQ.M.
GLUCOSE: 104 MG/DL (ref 70–99)
HBA1C MFR BLD: 6.4 % (ref 4.8–5.6)
HCT VFR BLD CALC: 48.2 % (ref 37.8–52.2)
HDLC SERPL-MCNC: 40 MG/DL
HEMOGLOBIN: 14.7 G/DL (ref 12.6–17.1)
LDL CHOLESTEROL CALCULATED: 74 MG/DL (ref 50–99)
LDL/HDL RATIO: 1.9
MCH RBC QN AUTO: 29 PG (ref 26–34)
MCHC RBC AUTO-ENTMCNC: 31 G/DL (ref 31–36)
MCV RBC AUTO: 94 FL (ref 80–95)
NON-HDL CHOLESTEROL: 95 MG/DL
PDW BLD-RTO: 14.8 % (ref 10–15.5)
PLATELET # BLD: 195 K/UL (ref 140–440)
PMV BLD AUTO: 10.8 FL (ref 9–13)
POTASSIUM SERPL-SCNC: 4.2 MMOL/L (ref 3.5–5.5)
RBC: 5.13 M/UL (ref 3.8–5.8)
SODIUM BLD-SCNC: 140 MMOL/L (ref 133–145)
TOTAL PROTEIN: 7.7 G/DL (ref 6.2–8.1)
TRIGL SERPL-MCNC: 103 MG/DL (ref 40–149)
TSH SERPL DL<=0.05 MIU/L-ACNC: 4.49 MCU/ML (ref 0.27–4.2)
VLDLC SERPL CALC-MCNC: 21 MG/DL (ref 8–30)
WBC: 10 K/UL (ref 4–11)

## 2024-04-26 DIAGNOSIS — R79.89 ELEVATED TSH: ICD-10-CM

## 2024-04-26 DIAGNOSIS — I10 PRIMARY HYPERTENSION: Primary | ICD-10-CM

## 2024-04-26 DIAGNOSIS — R73.03 PREDIABETES: ICD-10-CM

## 2024-05-01 DIAGNOSIS — I50.42 CHRONIC COMBINED SYSTOLIC AND DIASTOLIC CONGESTIVE HEART FAILURE (HCC): ICD-10-CM

## 2024-05-01 RX ORDER — DAPAGLIFLOZIN 5 MG/1
5 TABLET, FILM COATED ORAL EVERY MORNING
Qty: 30 TABLET | Refills: 5 | Status: SHIPPED | OUTPATIENT
Start: 2024-05-01

## 2024-05-02 NOTE — TELEPHONE ENCOUNTER
Patient is requesting an order for an oxygen machine due to his asthma and his breathing.      Please advise    Thank you

## 2024-06-11 DIAGNOSIS — I50.42 CHRONIC COMBINED SYSTOLIC AND DIASTOLIC CONGESTIVE HEART FAILURE (HCC): ICD-10-CM

## 2024-06-11 RX ORDER — CLOPIDOGREL BISULFATE 75 MG/1
TABLET ORAL
Qty: 90 TABLET | OUTPATIENT
Start: 2024-06-11

## 2024-06-15 DIAGNOSIS — I50.42 CHRONIC COMBINED SYSTOLIC AND DIASTOLIC CONGESTIVE HEART FAILURE (HCC): ICD-10-CM

## 2024-06-15 RX ORDER — CARVEDILOL 6.25 MG/1
TABLET ORAL
Qty: 180 TABLET | OUTPATIENT
Start: 2024-06-15

## (undated) DEVICE — PLUG CATH CAP URETH FOL STRL --

## (undated) DEVICE — CATH URETH INTMIT ROB 16FR FUN -- CONVERT TO ITEM 179520

## (undated) DEVICE — Device

## (undated) DEVICE — BANDAGE COMPR W4INXL5YD BGE COHESIVE SELF ADH ADBAN CBN1104] AVCOR HEALTHCARE PRODUCTS INC]

## (undated) DEVICE — RAZOR PREP DBL EDGE STRL DISP --

## (undated) DEVICE — SUTURE NONABSORBABLE SILK BRAID BLK PSL 2 0 30IN 486H

## (undated) DEVICE — BANDAGE,GAUZE,BULKEE II,4.5"X4.1YD,STRL: Brand: MEDLINE

## (undated) DEVICE — DEPAUL MAJOR PROCEDURE PACK: Brand: MEDLINE INDUSTRIES, INC.

## (undated) DEVICE — SYR 50ML LR LCK 1ML GRAD NSAF --

## (undated) DEVICE — SUTURE MCRYL SZ 4-0 L18IN ABSRB UD L19MM PS-2 3/8 CIR PRIM Y496G

## (undated) DEVICE — SPONGE GZ W4XL4IN COT 12 PLY TYP VII WVN C FLD DSGN

## (undated) DEVICE — REM POLYHESIVE ADULT PATIENT RETURN ELECTRODE: Brand: VALLEYLAB

## (undated) DEVICE — COVER LT HNDL BLU PLAS

## (undated) DEVICE — DERMABOND SKIN ADH 0.7ML -- DERMABOND ADVANCED 12/BX

## (undated) DEVICE — (D)PREP SKN CHLRAPRP APPL 26ML -- CONVERT TO ITEM 371833

## (undated) DEVICE — STERILE POLYISOPRENE POWDER-FREE SURGICAL GLOVES: Brand: PROTEXIS

## (undated) DEVICE — SUTURE VCRL SZ 3-0 L27IN ABSRB UD L26MM SH 1/2 CIR J416H

## (undated) DEVICE — KENDALL SCD EXPRESS SLEEVES, KNEE LENGTH, MEDIUM: Brand: KENDALL SCD

## (undated) DEVICE — SUTURE PDS II SZ 3-0 L27IN ABSRB VLT RB-1 L17MM 1/2 CIR Z305H

## (undated) DEVICE — CATH URETH FOL 2W SH 14FRX5ML -- CONVERT TO ITEM 363071

## (undated) DEVICE — SUTURE VCRL SZ 2-0 L27IN ABSRB UD L26MM SH 1/2 CIR J417H